# Patient Record
Sex: FEMALE | Race: BLACK OR AFRICAN AMERICAN | NOT HISPANIC OR LATINO | Employment: STUDENT | URBAN - METROPOLITAN AREA
[De-identification: names, ages, dates, MRNs, and addresses within clinical notes are randomized per-mention and may not be internally consistent; named-entity substitution may affect disease eponyms.]

---
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
## 2025-01-02 ENCOUNTER — HOSPITAL ENCOUNTER (EMERGENCY)
Facility: HOSPITAL | Age: 20
Discharge: HOME | End: 2025-01-02
Attending: EMERGENCY MEDICINE
Payer: COMMERCIAL

## 2025-01-02 ENCOUNTER — NURSE TRIAGE (OUTPATIENT)
Dept: ADMINISTRATIVE | Facility: CLINIC | Age: 20
End: 2025-01-02
Payer: COMMERCIAL

## 2025-01-02 VITALS
HEART RATE: 86 BPM | SYSTOLIC BLOOD PRESSURE: 103 MMHG | WEIGHT: 189 LBS | BODY MASS INDEX: 34.78 KG/M2 | RESPIRATION RATE: 18 BRPM | OXYGEN SATURATION: 100 % | DIASTOLIC BLOOD PRESSURE: 57 MMHG | TEMPERATURE: 98 F | HEIGHT: 62 IN

## 2025-01-02 DIAGNOSIS — R10.9 ABDOMINAL PAIN AFFECTING PREGNANCY: Primary | ICD-10-CM

## 2025-01-02 DIAGNOSIS — O26.899 ABDOMINAL PAIN AFFECTING PREGNANCY: Primary | ICD-10-CM

## 2025-01-02 DIAGNOSIS — O21.0 HYPEREMESIS GRAVIDARUM: ICD-10-CM

## 2025-01-02 LAB
ALBUMIN SERPL BCP-MCNC: 3.9 G/DL (ref 3.5–5.2)
ALP SERPL-CCNC: 44 U/L (ref 40–150)
ALT SERPL W/O P-5'-P-CCNC: 17 U/L (ref 10–44)
ANION GAP SERPL CALC-SCNC: 12 MMOL/L (ref 8–16)
AST SERPL-CCNC: 16 U/L (ref 10–40)
BACTERIA #/AREA URNS HPF: ABNORMAL /HPF
BASOPHILS # BLD AUTO: 0.03 K/UL (ref 0–0.2)
BASOPHILS NFR BLD: 0.2 % (ref 0–1.9)
BILIRUB SERPL-MCNC: 0.4 MG/DL (ref 0.1–1)
BILIRUB UR QL STRIP: NEGATIVE
BUN SERPL-MCNC: 7 MG/DL (ref 6–20)
CALCIUM SERPL-MCNC: 9 MG/DL (ref 8.7–10.5)
CHLORIDE SERPL-SCNC: 105 MMOL/L (ref 95–110)
CLARITY UR: ABNORMAL
CO2 SERPL-SCNC: 18 MMOL/L (ref 23–29)
COLOR UR: YELLOW
CREAT SERPL-MCNC: 0.7 MG/DL (ref 0.5–1.4)
DIFFERENTIAL METHOD BLD: ABNORMAL
EOSINOPHIL # BLD AUTO: 0.1 K/UL (ref 0–0.5)
EOSINOPHIL NFR BLD: 0.6 % (ref 0–8)
ERYTHROCYTE [DISTWIDTH] IN BLOOD BY AUTOMATED COUNT: 13 % (ref 11.5–14.5)
EST. GFR  (NO RACE VARIABLE): >60 ML/MIN/1.73 M^2
GLUCOSE SERPL-MCNC: 78 MG/DL (ref 70–110)
GLUCOSE UR QL STRIP: NEGATIVE
HCG INTACT+B SERPL-ACNC: NORMAL MIU/ML
HCT VFR BLD AUTO: 36.4 % (ref 37–48.5)
HGB BLD-MCNC: 12 G/DL (ref 12–16)
HGB UR QL STRIP: NEGATIVE
HYALINE CASTS #/AREA URNS LPF: 0 /LPF
IMM GRANULOCYTES # BLD AUTO: 0.06 K/UL (ref 0–0.04)
IMM GRANULOCYTES NFR BLD AUTO: 0.5 % (ref 0–0.5)
KETONES UR QL STRIP: ABNORMAL
LEUKOCYTE ESTERASE UR QL STRIP: ABNORMAL
LIPASE SERPL-CCNC: 27 U/L (ref 4–60)
LYMPHOCYTES # BLD AUTO: 2.3 K/UL (ref 1–4.8)
LYMPHOCYTES NFR BLD: 18.9 % (ref 18–48)
MCH RBC QN AUTO: 29 PG (ref 27–31)
MCHC RBC AUTO-ENTMCNC: 33 G/DL (ref 32–36)
MCV RBC AUTO: 88 FL (ref 82–98)
MICROSCOPIC COMMENT: ABNORMAL
MONOCYTES # BLD AUTO: 0.9 K/UL (ref 0.3–1)
MONOCYTES NFR BLD: 7.3 % (ref 4–15)
NEUTROPHILS # BLD AUTO: 8.9 K/UL (ref 1.8–7.7)
NEUTROPHILS NFR BLD: 72.5 % (ref 38–73)
NITRITE UR QL STRIP: NEGATIVE
NRBC BLD-RTO: 0 /100 WBC
PH UR STRIP: 6 [PH] (ref 5–8)
PLATELET # BLD AUTO: 240 K/UL (ref 150–450)
PLATELET BLD QL SMEAR: ABNORMAL
PMV BLD AUTO: 9.7 FL (ref 9.2–12.9)
POTASSIUM SERPL-SCNC: 3.3 MMOL/L (ref 3.5–5.1)
PROT SERPL-MCNC: 7.2 G/DL (ref 6–8.4)
PROT UR QL STRIP: ABNORMAL
RBC # BLD AUTO: 4.14 M/UL (ref 4–5.4)
RBC #/AREA URNS HPF: 0 /HPF (ref 0–4)
SODIUM SERPL-SCNC: 135 MMOL/L (ref 136–145)
SP GR UR STRIP: >1.03 (ref 1–1.03)
SQUAMOUS #/AREA URNS HPF: 25 /HPF
UNIDENT CRYS URNS QL MICRO: ABNORMAL
URN SPEC COLLECT METH UR: ABNORMAL
UROBILINOGEN UR STRIP-ACNC: NEGATIVE EU/DL
WBC # BLD AUTO: 12.26 K/UL (ref 3.9–12.7)
WBC #/AREA URNS HPF: 7 /HPF (ref 0–5)

## 2025-01-02 PROCEDURE — 99285 EMERGENCY DEPT VISIT HI MDM: CPT | Mod: 25

## 2025-01-02 PROCEDURE — 84702 CHORIONIC GONADOTROPIN TEST: CPT | Performed by: REGISTERED NURSE

## 2025-01-02 PROCEDURE — 85025 COMPLETE CBC W/AUTO DIFF WBC: CPT | Performed by: REGISTERED NURSE

## 2025-01-02 PROCEDURE — 96361 HYDRATE IV INFUSION ADD-ON: CPT

## 2025-01-02 PROCEDURE — 96374 THER/PROPH/DIAG INJ IV PUSH: CPT

## 2025-01-02 PROCEDURE — 80053 COMPREHEN METABOLIC PANEL: CPT | Performed by: REGISTERED NURSE

## 2025-01-02 PROCEDURE — 63600175 PHARM REV CODE 636 W HCPCS: Performed by: REGISTERED NURSE

## 2025-01-02 PROCEDURE — 83690 ASSAY OF LIPASE: CPT | Performed by: REGISTERED NURSE

## 2025-01-02 PROCEDURE — 96375 TX/PRO/DX INJ NEW DRUG ADDON: CPT

## 2025-01-02 PROCEDURE — 81000 URINALYSIS NONAUTO W/SCOPE: CPT | Performed by: REGISTERED NURSE

## 2025-01-02 PROCEDURE — 25000003 PHARM REV CODE 250: Performed by: REGISTERED NURSE

## 2025-01-02 RX ORDER — DIPHENHYDRAMINE HYDROCHLORIDE 50 MG/ML
12.5 INJECTION INTRAMUSCULAR; INTRAVENOUS
Status: COMPLETED | OUTPATIENT
Start: 2025-01-02 | End: 2025-01-02

## 2025-01-02 RX ORDER — METOCLOPRAMIDE HYDROCHLORIDE 5 MG/ML
10 INJECTION INTRAMUSCULAR; INTRAVENOUS
Status: COMPLETED | OUTPATIENT
Start: 2025-01-02 | End: 2025-01-02

## 2025-01-02 RX ADMIN — METOCLOPRAMIDE 10 MG: 5 INJECTION, SOLUTION INTRAMUSCULAR; INTRAVENOUS at 02:01

## 2025-01-02 RX ADMIN — DIPHENHYDRAMINE HYDROCHLORIDE 12.5 MG: 50 INJECTION, SOLUTION INTRAMUSCULAR; INTRAVENOUS at 02:01

## 2025-01-02 RX ADMIN — SODIUM CHLORIDE 1000 ML: 9 INJECTION, SOLUTION INTRAVENOUS at 02:01

## 2025-01-02 NOTE — Clinical Note
"Sandy Chapmananoop Aguilar was seen and treated in our emergency department on 1/2/2025.  She may return to work on 01/03/2025.       If you have any questions or concerns, please don't hesitate to call.       RN    "

## 2025-01-02 NOTE — Clinical Note
Chau Emanuel accompanied their spouse to the emergency department on 1/2/2025. They may return to work on 01/03/2025.      If you have any questions or concerns, please don't hesitate to call.       ADOLPH

## 2025-01-02 NOTE — TELEPHONE ENCOUNTER
Pt calling with c/o abdominal pain and vomiting. She said that the pain started after vomiting and it lower left side and she is 9 weeks preg. Pt triaged and care advice to go to the ED. Pt told to call back if any assistance once home or worsening.               Reason for Disposition   MODERATE-SEVERE abdominal pain    Additional Information   Negative: Passed out (e.g., fainted, lost consciousness, blacked out and was not responding)   Negative: Shock suspected (e.g., cold/pale/clammy skin, too weak to stand, low BP, rapid pulse)   Negative: Difficult to awaken or acting confused (e.g., disoriented, slurred speech)   Negative: Sounds like a life-threatening emergency to the triager    Protocols used: Pregnancy - Abdominal Pain Less Than 20 Weeks EGA-A-OH

## 2025-01-02 NOTE — Clinical Note
"Sandy Chapmananoop Aguilar was seen and treated in our emergency department on 1/2/2025.  She may return to school on 01/03/2025.      If you have any questions or concerns, please don't hesitate to call.       RN"

## 2025-01-02 NOTE — ED PROVIDER NOTES
Encounter Date: 2025       History     Chief Complaint   Patient presents with    Abdominal Pain     Pt reports LLQ sharp abdominal pain with nausea and vomiting starting today. Pt reports  9 weeks pregnant. -diarrhea     19-year-old female presents emergency department with complaints of abdominal pain.  Patient states pain began today.  History of hyperemesis.  Patient reports vomiting intermittently since she found out she was pregnant.  She denies any diarrhea, denies any fever, chills, chest pain, shortness of breath or any other symptoms.    The history is provided by the patient.     Review of patient's allergies indicates:  No Known Allergies  History reviewed. No pertinent past medical history.  Past Surgical History:   Procedure Laterality Date    CHONDROPLASTY OF KNEE Right 2022    Procedure: CHONDROPLASTY, KNEE;  Surgeon: Eleazar Del Rosario MD;  Location: [facility];  Service: Orthopedics;  Laterality: Right;    EXCISION OF MEDIAL MENISCUS OF KNEE Right 2022    Procedure: MENISCECTOMY, KNEE, MEDIAL;  Surgeon: Eleazar Del Rosario MD;  Location: [facility];  Service: Orthopedics;  Laterality: Right;    KNEE ARTHROSCOPY W/ MENISCECTOMY Right 2022    Procedure: ARTHROSCOPY, KNEE, WITH MENISCECTOMY;  Surgeon: Eleazar Del Rosario MD;  Location: [facility] OR;  Service: Orthopedics;  Laterality: Right;  medial meniscectomy    RECONSTRUCTION OF ANTERIOR CRUCIATE LIGAMENT USING GRAFT Right 2022    Procedure: RECONSTRUCTION, KNEE, ACL, USING GRAFT;  Surgeon: Eleazar Del Rosario MD;  Location: [facility];  Service: Orthopedics;  Laterality: Right;  cartilage harvest     Family History   Problem Relation Name Age of Onset    No Known Problems Mother      No Known Problems Father      Breast cancer Neg Hx      Ovarian cancer Neg Hx      Colon cancer Neg Hx       Social History     Tobacco Use    Smoking status: Never     Passive exposure: Yes    Smokeless tobacco: Never   Substance Use Topics    Alcohol  use: No    Drug use: Yes     Types: Marijuana     Review of Systems   Constitutional:  Negative for fever.   HENT:  Negative for sore throat.    Respiratory:  Negative for shortness of breath.    Cardiovascular:  Negative for chest pain.   Gastrointestinal:  Positive for abdominal pain, nausea and vomiting.   Genitourinary:  Negative for dysuria.   Musculoskeletal:  Negative for back pain.   Skin:  Negative for rash.   Neurological:  Negative for weakness.   Hematological:  Does not bruise/bleed easily.   All other systems reviewed and are negative.      Physical Exam     Initial Vitals [01/02/25 1306]   BP Pulse Resp Temp SpO2   115/63 82 16 97.5 °F (36.4 °C) 98 %      MAP       --         Physical Exam    Constitutional: She appears well-developed and well-nourished. She is not diaphoretic. No distress.   HENT:   Head: Normocephalic and atraumatic.   Eyes: Conjunctivae and EOM are normal. Pupils are equal, round, and reactive to light.   Neck: Neck supple.   Normal range of motion.  Cardiovascular:  Normal rate, regular rhythm and normal heart sounds.           No murmur heard.  Pulmonary/Chest: Breath sounds normal. No respiratory distress. She has no wheezes. She has no rales.   Abdominal: Abdomen is soft. Bowel sounds are normal. There is abdominal tenderness in the left lower quadrant. There is no rebound and no guarding.   Musculoskeletal:         General: No tenderness or edema. Normal range of motion.      Cervical back: Normal range of motion and neck supple.     Neurological: She is alert and oriented to person, place, and time. No cranial nerve deficit. GCS score is 15. GCS eye subscore is 4. GCS verbal subscore is 5. GCS motor subscore is 6.   Skin: Skin is warm and dry. Capillary refill takes less than 2 seconds.   Psychiatric: She has a normal mood and affect. Thought content normal.         ED Course   Procedures  Labs Reviewed   CBC W/ AUTO DIFFERENTIAL - Abnormal       Result Value    WBC 12.26       RBC 4.14      Hemoglobin 12.0      Hematocrit 36.4 (*)     MCV 88      MCH 29.0      MCHC 33.0      RDW 13.0      Platelets 240      MPV 9.7      Immature Granulocytes 0.5      Gran # (ANC) 8.9 (*)     Immature Grans (Abs) 0.06 (*)     Lymph # 2.3      Mono # 0.9      Eos # 0.1      Baso # 0.03      nRBC 0      Gran % 72.5      Lymph % 18.9      Mono % 7.3      Eosinophil % 0.6      Basophil % 0.2      Platelet Estimate Clumped (*)     Differential Method Automated      Narrative:     Release to patient->Immediate   COMPREHENSIVE METABOLIC PANEL - Abnormal    Sodium 135 (*)     Potassium 3.3 (*)     Chloride 105      CO2 18 (*)     Glucose 78      BUN 7      Creatinine 0.7      Calcium 9.0      Total Protein 7.2      Albumin 3.9      Total Bilirubin 0.4      Alkaline Phosphatase 44      AST 16      ALT 17      eGFR >60      Anion Gap 12      Narrative:     Release to patient->Immediate   URINALYSIS, REFLEX TO URINE CULTURE - Abnormal    Specimen UA Urine, Clean Catch      Color, UA Yellow      Appearance, UA Hazy (*)     pH, UA 6.0      Specific Gravity, UA >1.030 (*)     Protein, UA 1+ (*)     Glucose, UA Negative      Ketones, UA 2+ (*)     Bilirubin (UA) Negative      Occult Blood UA Negative      Nitrite, UA Negative      Urobilinogen, UA Negative      Leukocytes, UA 2+ (*)     Narrative:     Specimen Source->Urine   URINALYSIS MICROSCOPIC - Abnormal    RBC, UA 0      WBC, UA 7 (*)     Bacteria Occasional      Squam Epithel, UA 25      Hyaline Casts, UA 0      Unclass Kaye UA Occasional      Microscopic Comment SEE COMMENT      Narrative:     Specimen Source->Urine   LIPASE    Lipase 27      Narrative:     Release to patient->Immediate   HCG, QUANTITATIVE    HCG Quant [id number]      Narrative:     Release to patient->Immediate          Imaging Results              US OB <14 Wks, TransAbd, Single Gestation (Final result)  Result time 01/02/25 14:13:51      Final result by Sukhwinder Deleon MD (Timothy) (01/02/25  14:13:51)                   Impression:      1.  Viable intrauterine pregnancy.  Estimated sonographic age of 9 weeks and 4 days with ANÍBAL of 08/03/2025.      Electronically signed by: Sukhwinder Deleon MD  Date:    01/02/2025  Time:    14:13               Narrative:    EXAMINATION:  US OB <14 WEEKS TRANSABDOM, SINGLE GESTATION    CLINICAL HISTORY:  Other specified pregnancy related conditions, unspecified trimester    COMPARISON:  No comparison studies are available.    FINDINGS:  Viable intrauterine pregnancy.  Crown-rump length of 27 mm.  Fetal heart rate 171 beats per minute.  No evidence of subchorionic hemorrhage.  Uterus measures 9.1 cm.    Right ovary measures 2.7 x 1.9 x 2.1 cm.  Left ovary measures 3.0 x 1.8 x 1.5 cm.  Vascularity is present bilaterally.    No significant free fluid.                                       Medications   sodium chloride 0.9% bolus 1,000 mL 1,000 mL (0 mLs Intravenous Stopped 1/2/25 1607)   metoclopramide injection 10 mg (10 mg Intravenous Given 1/2/25 1421)   diphenhydrAMINE injection 12.5 mg (12.5 mg Intravenous Given 1/2/25 1422)     Medical Decision Making  Amount and/or Complexity of Data Reviewed  Labs: ordered.     Details: Unremarkable  Radiology: ordered.     Details: Viable IUP  Discussion of management or test interpretation with external provider(s): Patient had marked improvement after fluids and medications.  Patient has a complaints at this time.  She denies any abdominal pain at this time.  Patient has not vomited while in the emergency department.    Risk  Prescription drug management.  Risk Details: I discussed with patient and/or family/caretaker that evaluation in the ED does not suggest any emergent or life threatening medical conditions requiring immediate intervention beyond what was provided in the ED, and I believe patient is safe for discharge.  Regardless, an unremarkable evaluation in the ED does not preclude the development or presence of a serious of  life threatening condition. As such, patient was instructed to return immediately for any worsening or change in current symptoms.                                        Clinical Impression:  Final diagnoses:  [O26.899, R10.9] Abdominal pain affecting pregnancy (Primary)  [O21.0] Hyperemesis gravidarum          ED Disposition Condition    Discharge Stable          ED Prescriptions    None       Follow-up Information       Follow up With Specialties Details Why Contact [facility]    Clinic, [facility]  In 1 week  [address] [facility] [address] [place] [address]  [phone]      [facility] - Emergency Dept. Emergency Medicine  If symptoms worsen [address] [facility] [address] [phone]             Jones Banks Jr., P  01/02/25 [id number]

## 2025-01-02 NOTE — TELEPHONE ENCOUNTER
Pt is currently 9w4d pregnant.  C/O severe abdominal pain.  Line disconnected prior to transfer to NOC.  NOC made an attempt to contact pt without success; LVM on unidentified VM; # verified.  Pt called back and spoke with another NOC.  Please see other NOC note for symptom triage.  Encounter closed as duplicate encounter.    Reason for Disposition   Caller has already spoken with another triager and has no further questions    Additional Information   Negative: Caller has already spoken with the PCP (or office), and has no further questions    Protocols used: No Contact or Duplicate Contact Call-A-OH

## 2025-01-10 ENCOUNTER — HOSPITAL ENCOUNTER (EMERGENCY)
Facility: HOSPITAL | Age: 20
Discharge: LEFT BEFORE DISCHARGE | End: 2025-01-10
Attending: EMERGENCY MEDICINE
Payer: COMMERCIAL

## 2025-01-10 ENCOUNTER — TELEPHONE (OUTPATIENT)
Dept: OBSTETRICS AND GYNECOLOGY | Facility: CLINIC | Age: 20
End: 2025-01-10
Payer: COMMERCIAL

## 2025-01-10 VITALS
OXYGEN SATURATION: 100 % | HEIGHT: 62 IN | WEIGHT: 186 LBS | SYSTOLIC BLOOD PRESSURE: 119 MMHG | HEART RATE: 67 BPM | TEMPERATURE: 98 F | BODY MASS INDEX: 34.23 KG/M2 | RESPIRATION RATE: 16 BRPM | DIASTOLIC BLOOD PRESSURE: 64 MMHG

## 2025-01-10 DIAGNOSIS — N93.9 VAGINAL BLEEDING: ICD-10-CM

## 2025-01-10 DIAGNOSIS — O20.8 SUBCHORIONIC HEMORRHAGE IN FIRST TRIMESTER: Primary | ICD-10-CM

## 2025-01-10 LAB
AMORPH CRY URNS QL MICRO: NORMAL
BACTERIA #/AREA URNS HPF: NORMAL /HPF
BASOPHILS # BLD AUTO: 0.02 K/UL (ref 0–0.2)
BASOPHILS NFR BLD: 0.2 % (ref 0–1.9)
BILIRUB UR QL STRIP: NEGATIVE
CLARITY UR: CLEAR
COLOR UR: YELLOW
DIFFERENTIAL METHOD BLD: ABNORMAL
EOSINOPHIL # BLD AUTO: 0.1 K/UL (ref 0–0.5)
EOSINOPHIL NFR BLD: 1.2 % (ref 0–8)
ERYTHROCYTE [DISTWIDTH] IN BLOOD BY AUTOMATED COUNT: 12.8 % (ref 11.5–14.5)
GLUCOSE UR QL STRIP: NEGATIVE
HCG INTACT+B SERPL-ACNC: NORMAL MIU/ML
HCT VFR BLD AUTO: 36.4 % (ref 37–48.5)
HGB BLD-MCNC: 12.2 G/DL (ref 12–16)
HGB UR QL STRIP: NEGATIVE
HYALINE CASTS #/AREA URNS LPF: 0 /LPF
IMM GRANULOCYTES # BLD AUTO: 0.02 K/UL (ref 0–0.04)
IMM GRANULOCYTES NFR BLD AUTO: 0.2 % (ref 0–0.5)
KETONES UR QL STRIP: ABNORMAL
LEUKOCYTE ESTERASE UR QL STRIP: NEGATIVE
LYMPHOCYTES # BLD AUTO: 2 K/UL (ref 1–4.8)
LYMPHOCYTES NFR BLD: 22.9 % (ref 18–48)
MCH RBC QN AUTO: 29 PG (ref 27–31)
MCHC RBC AUTO-ENTMCNC: 33.5 G/DL (ref 32–36)
MCV RBC AUTO: 87 FL (ref 82–98)
MICROSCOPIC COMMENT: NORMAL
MONOCYTES # BLD AUTO: 0.8 K/UL (ref 0.3–1)
MONOCYTES NFR BLD: 9 % (ref 4–15)
NEUTROPHILS # BLD AUTO: 5.8 K/UL (ref 1.8–7.7)
NEUTROPHILS NFR BLD: 66.5 % (ref 38–73)
NITRITE UR QL STRIP: NEGATIVE
NRBC BLD-RTO: 0 /100 WBC
PH UR STRIP: 8 [PH] (ref 5–8)
PLATELET # BLD AUTO: 288 K/UL (ref 150–450)
PMV BLD AUTO: 9 FL (ref 9.2–12.9)
PROT UR QL STRIP: ABNORMAL
RBC # BLD AUTO: 4.2 M/UL (ref 4–5.4)
RBC #/AREA URNS HPF: 0 /HPF (ref 0–4)
SP GR UR STRIP: 1.03 (ref 1–1.03)
SQUAMOUS #/AREA URNS HPF: 2 /HPF
URN SPEC COLLECT METH UR: ABNORMAL
UROBILINOGEN UR STRIP-ACNC: ABNORMAL EU/DL
WBC # BLD AUTO: 8.69 K/UL (ref 3.9–12.7)
WBC #/AREA URNS HPF: 0 /HPF (ref 0–5)

## 2025-01-10 PROCEDURE — 81000 URINALYSIS NONAUTO W/SCOPE: CPT | Performed by: NURSE PRACTITIONER

## 2025-01-10 PROCEDURE — 99900041 HC LEFT WITHOUT BEING SEEN- EMERGENCY

## 2025-01-10 PROCEDURE — 85025 COMPLETE CBC W/AUTO DIFF WBC: CPT | Performed by: NURSE PRACTITIONER

## 2025-01-10 PROCEDURE — 84702 CHORIONIC GONADOTROPIN TEST: CPT | Performed by: NURSE PRACTITIONER

## 2025-01-10 NOTE — TELEPHONE ENCOUNTER
----- Message from Simran sent at 1/10/2025 12:19 PM CST -----  Contact: Arrion  Type:  Needs Medical Advice    Who Called: Arrion  Symptoms (please be specific): Bleeding, light cramps  How long has patient had these symptoms:  Yesterday 1\9\25 and this morning  Pharmacy name and phone #:    [id number] - [place] - [address] AT [facility] [place] [address]  Phone: [phone] Fax: [phone]  Would the patient rather a call back or a response via [facility]? Call back  Best Call Back Number:  Please call her at   Additional Information: She stated the bleeding has stop and when she notice it at work yesterday she did not go to ER

## 2025-01-10 NOTE — FIRST PROVIDER EVALUATION
Medical screening examination initiated.  I have conducted a focused provider triage encounter, findings are as follows:    Brief history of present illness:   patient complains of vaginal bleeding sent by [facility]     Patient left from the waiting room after ultrasound before physical exam    Brief workup plan:   lab work ultrasound    Preliminary workup initiated; this workup will be continued and followed by the physician or advanced practice provider that is assigned to the patient when roomed.

## 2025-01-10 NOTE — TELEPHONE ENCOUNTER
Pt called in regards to bleeding/ spotting.  Pt states she was at work yesterday when she noticed blood. Pt states she did not have any bleeding today, but is very concerned advised pt to report to ED to be assessed. Pt KENNETH BERRIOS LPN  OB/GYN

## 2025-01-14 ENCOUNTER — ROUTINE PRENATAL (OUTPATIENT)
Dept: OBSTETRICS AND GYNECOLOGY | Facility: CLINIC | Age: 20
End: 2025-01-14
Payer: COMMERCIAL

## 2025-01-14 VITALS
SYSTOLIC BLOOD PRESSURE: 105 MMHG | DIASTOLIC BLOOD PRESSURE: 60 MMHG | WEIGHT: 189.63 LBS | BODY MASS INDEX: 34.68 KG/M2

## 2025-01-14 DIAGNOSIS — O21.9 NAUSEA/VOMITING IN PREGNANCY: ICD-10-CM

## 2025-01-14 DIAGNOSIS — Z34.01 PRIMIGRAVIDA IN FIRST TRIMESTER: ICD-10-CM

## 2025-01-14 DIAGNOSIS — Z3A.11 11 WEEKS GESTATION OF PREGNANCY: Primary | ICD-10-CM

## 2025-01-14 PROBLEM — Z30.011 OCP (ORAL CONTRACEPTIVE PILLS) INITIATION: Status: RESOLVED | Noted: 2024-08-19 | Resolved: 2025-01-14

## 2025-01-14 PROCEDURE — 0502F SUBSEQUENT PRENATAL CARE: CPT | Mod: CPTII,S$GLB,, | Performed by: MIDWIFE

## 2025-01-14 PROCEDURE — 99999 PR PBB SHADOW E&M-EST. PATIENT-LVL II: CPT | Mod: PBBFAC,,, | Performed by: MIDWIFE

## 2025-01-14 RX ORDER — PROMETHAZINE HYDROCHLORIDE 25 MG/1
25 TABLET ORAL NIGHTLY PRN
Qty: 30 TABLET | Refills: 1 | Status: SHIPPED | OUTPATIENT
Start: 2025-01-14

## 2025-01-14 RX ORDER — FOLIC ACID 1 MG/1
1 TABLET ORAL DAILY
Qty: 30 TABLET | Refills: 11 | Status: SHIPPED | OUTPATIENT
Start: 2025-01-14 | End: 2026-01-14

## 2025-01-14 NOTE — LETTER
January 14, 2025      [facility] 2nd Fl  [address] [facility]  [place] [address]  Phone: [phone]  Fax: [phone]       Patient: Sandy Aguilar   YOB: 2005  Date of Visit: 01/14/2025    To Whom It May Concern:    Chau Emanuel accompanied Sandy Aguilar at [facility] on 01/14/2025. The patient may return to work/school on 1/15/2025 with no restrictions. If you have any questions or concerns, or if I can be of further assistance, please do not hesitate to contact me.    Sincerely,    Comfort Davenport CNM

## 2025-01-14 NOTE — LETTER
January 14, 2025      [facility] 2nd [facility]  [address] [facility]  [place] [address]  Phone: [phone]  Fax: [phone]       Patient: Sandy Aguilar   YOB: 2005  Date of Visit: 01/14/2025    To Whom It May Concern:    Sandy Aguilar  was at [facility] on 01/14/2025. The patient may return to work/school on 1/16/2025 with restrictions. If you have any questions or concerns, or if I can be of further assistance, please do not hesitate to contact me.    Sincerely,    Comfort Davenport CNM

## 2025-01-14 NOTE — LETTER
January 14, 2025    Sandy Aguilar  [address]  [facility] [address]              [facility]  [address]  [place] [address]  Phone: [phone]  Fax: [phone]    To Whom It May Concern:    Ms. Aguilar is currently under our care for pregnancy.  Estimated Date of Delivery: ***        Sincerely,    Aaliyah White MA

## 2025-01-14 NOTE — LETTER
January 14, 2025    Sandy Aguilar  [address]  [facility] [address]         [facility] 2nd Fl  [address]  [place] [address]  Phone: [phone]  Fax: [phone] January 14, 2025     Patient: Sandy Aguilar   YOB: 2005   Date of Visit: 1/14/2025       To Whom It May Concern:    It is my medical opinion that Sandy Aguilar may return to light duty immediately with the following restrictions: no heavy lifting of more than 15lbs .    If you have any questions or concerns, please don't hesitate to call.    Sincerely,        Comfort Davenport CNM

## 2025-01-23 NOTE — PROGRESS NOTES
11 weeks gestation of pregnancy  -     Connected MOM Enrollment  -     Assign Connected MOM Program Consent Questionnaire    Overall doing OK, went to the ER 1/10 for spotting & normal U/S. No VB at present.  Declines genetic screening  Connected MOM order placed and instructions shila  TWG: -7.1oz  Bleeding and pain precautions reviewed  RTC in 4 weeks, sooner if needed     Primigravida in first trimester    Nausea/vomiting in pregnancy  -     promethazine (PHENERGAN) 25 MG tablet; Take 1 tablet (25 mg total) by mouth nightly as needed for Nausea.  Dispense: 30 tablet; Refill: 1  -     folic acid (FOLVITE) 1 MG tablet; Take 1 tablet (1 mg total) by mouth once daily.  Dispense: 30 tablet; Refill: 11    Vomiting 1X/day with persistent nausea. Rx Phenergan, discussed may cause drowsiness. Unable to tolerate PNV --Rx folic acid.

## 2025-02-07 ENCOUNTER — ROUTINE PRENATAL (OUTPATIENT)
Dept: OBSTETRICS AND GYNECOLOGY | Facility: CLINIC | Age: 20
End: 2025-02-07
Payer: COMMERCIAL

## 2025-02-07 VITALS
BODY MASS INDEX: 33.67 KG/M2 | SYSTOLIC BLOOD PRESSURE: 118 MMHG | DIASTOLIC BLOOD PRESSURE: 80 MMHG | WEIGHT: 184.06 LBS

## 2025-02-07 DIAGNOSIS — Z3A.14 14 WEEKS GESTATION OF PREGNANCY: Primary | ICD-10-CM

## 2025-02-07 DIAGNOSIS — Z36.89 ENCOUNTER FOR FETAL ANATOMIC SURVEY: ICD-10-CM

## 2025-02-07 PROCEDURE — 0502F SUBSEQUENT PRENATAL CARE: CPT | Mod: CPTII,S$GLB,,

## 2025-02-07 PROCEDURE — 99999 PR PBB SHADOW E&M-EST. PATIENT-LVL III: CPT | Mod: PBBFAC,,,

## 2025-02-07 NOTE — PROGRESS NOTES
19 y.o. female  at 14w5d   is not feeling flutters yet/FM, denies VB, LOF or cramping  Doing well. Patient does have a cold. Denies fever/chills/body aches. Reviewed pregnancy safe medications for cold and congestion. Patient verbalized understanding and will try these.    TWG: -5 lbs   Anatomy US NV  Genetic testing declined  Connected MoMs    1. 14 weeks gestation of pregnancy  - routine PNC   2. Encounter for fetal anatomic survey  -     US OB/GYN Procedure ([facility])-Future; Future         Reviewed warning signs, normal FM,  labor precautions and how/when to call. Patient states understanding.  RTC x 5 wks, call or present sooner prn.        Patent

## 2025-02-16 ENCOUNTER — PATIENT MESSAGE (OUTPATIENT)
Dept: OTHER | Facility: OTHER | Age: 20
End: 2025-02-16
Payer: COMMERCIAL

## 2025-02-23 ENCOUNTER — PATIENT MESSAGE (OUTPATIENT)
Dept: OTHER | Facility: OTHER | Age: 20
End: 2025-02-23
Payer: COMMERCIAL

## 2025-03-07 ENCOUNTER — PROCEDURE VISIT (OUTPATIENT)
Dept: OBSTETRICS AND GYNECOLOGY | Facility: CLINIC | Age: 20
End: 2025-03-07
Payer: COMMERCIAL

## 2025-03-07 ENCOUNTER — ROUTINE PRENATAL (OUTPATIENT)
Dept: OBSTETRICS AND GYNECOLOGY | Facility: CLINIC | Age: 20
End: 2025-03-07
Payer: COMMERCIAL

## 2025-03-07 ENCOUNTER — PATIENT MESSAGE (OUTPATIENT)
Dept: OBSTETRICS AND GYNECOLOGY | Facility: CLINIC | Age: 20
End: 2025-03-07

## 2025-03-07 VITALS
BODY MASS INDEX: 34.88 KG/M2 | HEART RATE: 96 BPM | SYSTOLIC BLOOD PRESSURE: 117 MMHG | DIASTOLIC BLOOD PRESSURE: 68 MMHG | WEIGHT: 190.69 LBS

## 2025-03-07 DIAGNOSIS — Z36.89 ENCOUNTER FOR FETAL ANATOMIC SURVEY: ICD-10-CM

## 2025-03-07 DIAGNOSIS — Z3A.18 18 WEEKS GESTATION OF PREGNANCY: Primary | ICD-10-CM

## 2025-03-07 PROCEDURE — 76811 OB US DETAILED SNGL FETUS: CPT | Mod: S$GLB,,, | Performed by: OBSTETRICS & GYNECOLOGY

## 2025-03-07 PROCEDURE — 99999 PR PBB SHADOW E&M-EST. PATIENT-LVL III: CPT | Mod: PBBFAC,,,

## 2025-03-07 NOTE — PROGRESS NOTES
19 y.o. female  at 18w5d   is feeling flutters /FM, denies VB, LOF or cramping  Doing well. Still has cough and congestion since last visit.  Discussed mucinex, hydration, and allergy medications during pregnancy. Patient verbalized understanding   TW lbs   Reviewed anatomy US: , vertex, posterior placenta, 3VC, JAMILAH wNL, CL 35.3mm, anatomy appears to be complete, will await MFM review  Genetic testing declined     1. 18 weeks gestation of pregnancy  - routine PNC        Reviewed warning signs, normal FM,  labor precautions and how/when to call. Patient states understanding.  RTC x 4 wks, call or present sooner prn.

## 2025-03-16 ENCOUNTER — PATIENT MESSAGE (OUTPATIENT)
Dept: OTHER | Facility: OTHER | Age: 20
End: 2025-03-16
Payer: COMMERCIAL

## 2025-04-04 ENCOUNTER — ROUTINE PRENATAL (OUTPATIENT)
Dept: OBSTETRICS AND GYNECOLOGY | Facility: CLINIC | Age: 20
End: 2025-04-04
Payer: COMMERCIAL

## 2025-04-04 VITALS — DIASTOLIC BLOOD PRESSURE: 72 MMHG | BODY MASS INDEX: 34.84 KG/M2 | WEIGHT: 190.5 LBS | SYSTOLIC BLOOD PRESSURE: 124 MMHG

## 2025-04-04 DIAGNOSIS — O21.9 NAUSEA AND VOMITING DURING PREGNANCY: ICD-10-CM

## 2025-04-04 DIAGNOSIS — Z34.02 ENCOUNTER FOR SUPERVISION OF NORMAL FIRST PREGNANCY IN SECOND TRIMESTER: Primary | ICD-10-CM

## 2025-04-04 PROCEDURE — 99999 PR PBB SHADOW E&M-EST. PATIENT-LVL III: CPT | Mod: PBBFAC,,, | Performed by: STUDENT IN AN ORGANIZED HEALTH CARE EDUCATION/TRAINING PROGRAM

## 2025-04-04 RX ORDER — ONDANSETRON 4 MG/1
4 TABLET, ORALLY DISINTEGRATING ORAL EVERY 8 HOURS PRN
Qty: 30 TABLET | Refills: 2 | Status: SHIPPED | OUTPATIENT
Start: 2025-04-04

## 2025-04-04 RX ORDER — ONDANSETRON 4 MG/1
4 TABLET, ORALLY DISINTEGRATING ORAL EVERY 8 HOURS PRN
COMMUNITY
End: 2025-04-04 | Stop reason: SDUPTHER

## 2025-04-04 NOTE — PROGRESS NOTES
RETURN OB VISIT     CC: Scheduled prenatal visit at 22w5d    SUBJECTIVE:  The patient denies contractions.  She denies vaginal bleeding, leakage of fluid. She reports good fetal movement.    She was worried because the baby was more    Still having vomiting everyday. Some times more than once in the day. Usually right in the morning, other than that she is feeling okay. She is out of her zofran.     OBJECTIVE:  Physical Exam:  BP: 124/72  Weight: 86.4 kg (190 lb 7.6 oz)  Cardiac: warm and well perfused  Pulm: breathing comfortably on RA   Abd: soft, gravid, non-tender  Ext: symmetric; no significant bilateral lower extremity edema or bruising, moves all 4 extremities equally  Fetal Wellbeing: Fundal Height (cm): 22 cm, Fetal Heart Rate: 161, See Prenatal Flow Sheet     Assessment and Plan   Ms. Aguilar is a 19 y.o.,  22w5d who presents here for Routine Prenatal Visit   . She is overall doing well.    Encounter for supervision of normal first pregnancy in second trimester    Nausea and vomiting during pregnancy  -     ondansetron (ZOFRAN-ODT) 4 MG TbDL; Take 1 tablet (4 mg total) by mouth every 8 (eight) hours as needed (nausea).  Dispense: 30 tablet; Refill: 2       Lorin nKott MD  Obstetrics and Gynecology  [facility] [place], [place]

## 2025-04-13 ENCOUNTER — PATIENT MESSAGE (OUTPATIENT)
Dept: OTHER | Facility: OTHER | Age: 20
End: 2025-04-13
Payer: COMMERCIAL

## 2025-04-27 ENCOUNTER — PATIENT MESSAGE (OUTPATIENT)
Dept: OTHER | Facility: OTHER | Age: 20
End: 2025-04-27
Payer: COMMERCIAL

## 2025-05-02 ENCOUNTER — ROUTINE PRENATAL (OUTPATIENT)
Dept: OBSTETRICS AND GYNECOLOGY | Facility: CLINIC | Age: 20
End: 2025-05-02
Payer: COMMERCIAL

## 2025-05-02 VITALS — DIASTOLIC BLOOD PRESSURE: 62 MMHG | WEIGHT: 190.25 LBS | BODY MASS INDEX: 34.8 KG/M2 | SYSTOLIC BLOOD PRESSURE: 102 MMHG

## 2025-05-02 DIAGNOSIS — Z34.02 ENCOUNTER FOR SUPERVISION OF NORMAL FIRST PREGNANCY IN SECOND TRIMESTER: ICD-10-CM

## 2025-05-02 DIAGNOSIS — Z3A.26 26 WEEKS GESTATION OF PREGNANCY: Primary | ICD-10-CM

## 2025-05-02 PROBLEM — Z34.01 PRIMIGRAVIDA IN FIRST TRIMESTER: Status: RESOLVED | Noted: 2025-01-14 | Resolved: 2025-05-02

## 2025-05-02 PROCEDURE — 99999 PR PBB SHADOW E&M-EST. PATIENT-LVL II: CPT | Mod: PBBFAC,,,

## 2025-05-11 ENCOUNTER — PATIENT MESSAGE (OUTPATIENT)
Dept: OTHER | Facility: OTHER | Age: 20
End: 2025-05-11
Payer: MEDICAID

## 2025-05-16 ENCOUNTER — ROUTINE PRENATAL (OUTPATIENT)
Dept: OBSTETRICS AND GYNECOLOGY | Facility: CLINIC | Age: 20
End: 2025-05-16
Payer: MEDICAID

## 2025-05-16 ENCOUNTER — LAB VISIT (OUTPATIENT)
Dept: LAB | Facility: HOSPITAL | Age: 20
End: 2025-05-16
Payer: COMMERCIAL

## 2025-05-16 VITALS — SYSTOLIC BLOOD PRESSURE: 108 MMHG | WEIGHT: 199.5 LBS | DIASTOLIC BLOOD PRESSURE: 65 MMHG | BODY MASS INDEX: 36.49 KG/M2

## 2025-05-16 DIAGNOSIS — Z23 ENCOUNTER FOR VACCINATION: ICD-10-CM

## 2025-05-16 DIAGNOSIS — Z3A.26 26 WEEKS GESTATION OF PREGNANCY: ICD-10-CM

## 2025-05-16 DIAGNOSIS — Z3A.28 28 WEEKS GESTATION OF PREGNANCY: Primary | ICD-10-CM

## 2025-05-16 LAB
ABSOLUTE EOSINOPHIL (OHS): 0.07 K/UL
ABSOLUTE MONOCYTE (OHS): 0.87 K/UL (ref 0.3–1)
ABSOLUTE NEUTROPHIL COUNT (OHS): 11.21 K/UL (ref 1.8–7.7)
BASOPHILS # BLD AUTO: 0.02 K/UL
BASOPHILS NFR BLD AUTO: 0.1 %
ERYTHROCYTE [DISTWIDTH] IN BLOOD BY AUTOMATED COUNT: 13.5 % (ref 11.5–14.5)
GLUCOSE SERPL-MCNC: 161 MG/DL (ref 70–140)
HCT VFR BLD AUTO: 35.7 % (ref 37–48.5)
HGB BLD-MCNC: 11.5 GM/DL (ref 12–16)
HIV 1+2 AB+HIV1 P24 AG SERPL QL IA: NORMAL
IMM GRANULOCYTES # BLD AUTO: 0.07 K/UL (ref 0–0.04)
IMM GRANULOCYTES NFR BLD AUTO: 0.5 % (ref 0–0.5)
LYMPHOCYTES # BLD AUTO: 1.69 K/UL (ref 1–4.8)
MCH RBC QN AUTO: 29.5 PG (ref 27–31)
MCHC RBC AUTO-ENTMCNC: 32.2 G/DL (ref 32–36)
MCV RBC AUTO: 92 FL (ref 82–98)
NUCLEATED RBC (/100WBC) (OHS): 0 /100 WBC
PLATELET # BLD AUTO: 265 K/UL (ref 150–450)
PMV BLD AUTO: 11.1 FL (ref 9.2–12.9)
RBC # BLD AUTO: 3.9 M/UL (ref 4–5.4)
RELATIVE EOSINOPHIL (OHS): 0.5 %
RELATIVE LYMPHOCYTE (OHS): 12.1 % (ref 18–48)
RELATIVE MONOCYTE (OHS): 6.2 % (ref 4–15)
RELATIVE NEUTROPHIL (OHS): 80.6 % (ref 38–73)
T PALLIDUM IGG+IGM SER QL: NORMAL
WBC # BLD AUTO: 13.93 K/UL (ref 3.9–12.7)

## 2025-05-16 PROCEDURE — 36415 COLL VENOUS BLD VENIPUNCTURE: CPT

## 2025-05-16 PROCEDURE — 99213 OFFICE O/P EST LOW 20 MIN: CPT | Mod: PBBFAC

## 2025-05-16 PROCEDURE — 87389 HIV-1 AG W/HIV-1&-2 AB AG IA: CPT

## 2025-05-16 PROCEDURE — 85025 COMPLETE CBC W/AUTO DIFF WBC: CPT

## 2025-05-16 PROCEDURE — 82950 GLUCOSE TEST: CPT

## 2025-05-16 PROCEDURE — 86593 SYPHILIS TEST NON-TREP QUANT: CPT

## 2025-05-16 PROCEDURE — 99999 PR PBB SHADOW E&M-EST. PATIENT-LVL III: CPT | Mod: PBBFAC,,,

## 2025-05-16 NOTE — PROGRESS NOTES
19 y.o. female  at 28w5d   Reports + FM, denies VB, LOF or CTX  Doing well without concerns  TW lbs   28wk labs today (O POS)  Tdap today  LD card given and reviewed    1. 28 weeks gestation of pregnancy  - routine PNC   2. Encounter for vaccination  -     Tdap (BOOSTRIX) vaccine injection 0.5 mL         Reviewed warning signs, normal FKCs,  labor precautions and how/when to call. Patient states understanding  RTC x 2 wks, call or present sooner prn.

## 2025-05-16 NOTE — NURSING
Patient is here for encounter for TDAP     Verified patient with 2 patient identifiers. Allergies and medications reviewed.   TDAP given IM to Left Deltoid using aseptic technique.   No discomfort noted. Patient tolerated well.     Next appointment scheduled.     Patient advised to wait 15 minutes in lobby to monitor for reaction.   Patient verbalized understanding.

## 2025-05-23 ENCOUNTER — RESULTS FOLLOW-UP (OUTPATIENT)
Dept: OBSTETRICS AND GYNECOLOGY | Facility: CLINIC | Age: 20
End: 2025-05-23

## 2025-05-23 ENCOUNTER — PATIENT MESSAGE (OUTPATIENT)
Dept: OBSTETRICS AND GYNECOLOGY | Facility: CLINIC | Age: 20
End: 2025-05-23
Payer: MEDICAID

## 2025-05-23 DIAGNOSIS — O99.810 ABNORMAL O'SULLIVAN GLUCOSE CHALLENGE TEST, ANTEPARTUM: Primary | ICD-10-CM

## 2025-05-30 ENCOUNTER — ROUTINE PRENATAL (OUTPATIENT)
Dept: OBSTETRICS AND GYNECOLOGY | Facility: CLINIC | Age: 20
End: 2025-05-30
Payer: COMMERCIAL

## 2025-05-30 VITALS
SYSTOLIC BLOOD PRESSURE: 114 MMHG | BODY MASS INDEX: 36.73 KG/M2 | DIASTOLIC BLOOD PRESSURE: 78 MMHG | WEIGHT: 200.81 LBS

## 2025-05-30 DIAGNOSIS — O99.810 ABNORMAL O'SULLIVAN GLUCOSE CHALLENGE TEST, ANTEPARTUM: ICD-10-CM

## 2025-05-30 DIAGNOSIS — Z36.89 ENCOUNTER FOR ULTRASOUND TO ASSESS FETAL GROWTH: ICD-10-CM

## 2025-05-30 DIAGNOSIS — Z34.03 SUPERVISION OF NORMAL FIRST TEEN PREGNANCY IN THIRD TRIMESTER: Primary | ICD-10-CM

## 2025-05-30 DIAGNOSIS — Z34.02 ENCOUNTER FOR SUPERVISION OF NORMAL FIRST PREGNANCY IN SECOND TRIMESTER: ICD-10-CM

## 2025-05-30 PROCEDURE — 99999 PR PBB SHADOW E&M-EST. PATIENT-LVL III: CPT | Mod: PBBFAC,,, | Performed by: MIDWIFE

## 2025-05-30 NOTE — PROGRESS NOTES
19 y.o. female  at 30w5d   Reports + FM, denies VB, LOF or CTX  Doing well without concerns.  TW lbs     Supervision of normal first teen pregnancy in third trimester  -     Reviewed 28wk lab results (O POS), failed 1hr gtt, 3hr ordered but not completed. Discussed importance of completing 3hr gtt to determine GDM. Pt states unable to complete today, but will return 6/3/25 for completion. Reviewed GDM and importance of detection in pregnancy.  -Growth US scheduled for nv at 32w    Abnormal O'Carrillo glucose challenge test, antepartum  Overview:  Failed 1 hour; needs 3 hour GTT  Declines testing today, will return 6/3/25 for 3hr gtt       Reviewed warning signs, normal FKCs,  labor precautions and how/when to call.  RTC x 2 wks, call or present sooner prn.

## 2025-06-04 ENCOUNTER — LAB VISIT (OUTPATIENT)
Dept: LAB | Facility: HOSPITAL | Age: 20
End: 2025-06-04
Payer: COMMERCIAL

## 2025-06-04 DIAGNOSIS — O99.810 ABNORMAL O'SULLIVAN GLUCOSE CHALLENGE TEST, ANTEPARTUM: ICD-10-CM

## 2025-06-04 LAB
EAG (OHS): 105 MG/DL (ref 68–131)
GLUCOSE SERPL-MCNC: 115 MG/DL (ref 70–139)
GLUCOSE SERPL-MCNC: 130 MG/DL (ref 70–154)
GLUCOSE SERPL-MCNC: 183 MG/DL (ref 70–179)
GLUCOSE SERPL-MCNC: 71 MG/DL (ref 70–94)
HBA1C MFR BLD: 5.3 % (ref 4–5.6)

## 2025-06-04 PROCEDURE — 82947 ASSAY GLUCOSE BLOOD QUANT: CPT

## 2025-06-04 PROCEDURE — 83036 HEMOGLOBIN GLYCOSYLATED A1C: CPT

## 2025-06-04 PROCEDURE — 36415 COLL VENOUS BLD VENIPUNCTURE: CPT

## 2025-06-04 PROCEDURE — 82951 GLUCOSE TOLERANCE TEST (GTT): CPT

## 2025-06-04 PROCEDURE — 82950 GLUCOSE TEST: CPT

## 2025-06-05 ENCOUNTER — RESULTS FOLLOW-UP (OUTPATIENT)
Dept: OBSTETRICS AND GYNECOLOGY | Facility: HOSPITAL | Age: 20
End: 2025-06-05

## 2025-06-08 ENCOUNTER — PATIENT MESSAGE (OUTPATIENT)
Dept: OTHER | Facility: OTHER | Age: 20
End: 2025-06-08
Payer: COMMERCIAL

## 2025-06-13 ENCOUNTER — HOSPITAL ENCOUNTER (INPATIENT)
Facility: HOSPITAL | Age: 20
LOS: 7 days | Discharge: HOME | End: 2025-06-20
Attending: OBSTETRICS & GYNECOLOGY | Admitting: OBSTETRICS & GYNECOLOGY
Payer: COMMERCIAL

## 2025-06-13 ENCOUNTER — PROCEDURE VISIT (OUTPATIENT)
Dept: OBSTETRICS AND GYNECOLOGY | Facility: CLINIC | Age: 20
End: 2025-06-13
Payer: COMMERCIAL

## 2025-06-13 ENCOUNTER — ROUTINE PRENATAL (OUTPATIENT)
Dept: OBSTETRICS AND GYNECOLOGY | Facility: CLINIC | Age: 20
End: 2025-06-13
Payer: COMMERCIAL

## 2025-06-13 VITALS
WEIGHT: 201.06 LBS | DIASTOLIC BLOOD PRESSURE: 72 MMHG | SYSTOLIC BLOOD PRESSURE: 126 MMHG | BODY MASS INDEX: 36.77 KG/M2

## 2025-06-13 DIAGNOSIS — Z34.03 SUPERVISION OF NORMAL FIRST TEEN PREGNANCY IN THIRD TRIMESTER: ICD-10-CM

## 2025-06-13 DIAGNOSIS — Z3A.32 32 WEEKS GESTATION OF PREGNANCY: Primary | ICD-10-CM

## 2025-06-13 DIAGNOSIS — O99.810 ABNORMAL O'SULLIVAN GLUCOSE CHALLENGE TEST, ANTEPARTUM: ICD-10-CM

## 2025-06-13 DIAGNOSIS — Z98.891 S/P CESAREAN SECTION: Primary | ICD-10-CM

## 2025-06-13 DIAGNOSIS — O36.5990 PREGNANCY AFFECTED BY FETAL GROWTH RESTRICTION: ICD-10-CM

## 2025-06-13 DIAGNOSIS — O36.8390 NON-REASSURING FETAL HEART TONES COMPLICATING PREGNANCY, ANTEPARTUM: ICD-10-CM

## 2025-06-13 DIAGNOSIS — O36.5990 FETAL GROWTH RESTRICTION ANTEPARTUM: ICD-10-CM

## 2025-06-13 DIAGNOSIS — Z36.89 ENCOUNTER FOR ULTRASOUND TO ASSESS FETAL GROWTH: ICD-10-CM

## 2025-06-13 PROBLEM — O16.3 ELEVATED BLOOD PRESSURE AFFECTING PREGNANCY IN THIRD TRIMESTER, ANTEPARTUM: Status: ACTIVE | Noted: 2025-06-13

## 2025-06-13 LAB
ABSOLUTE EOSINOPHIL (OHS): 0.05 K/UL
ABSOLUTE MONOCYTE (OHS): 0.77 K/UL (ref 0.3–1)
ABSOLUTE NEUTROPHIL COUNT (OHS): 10.33 K/UL (ref 1.8–7.7)
ALBUMIN SERPL BCP-MCNC: 2.8 G/DL (ref 3.5–5.2)
ALP SERPL-CCNC: 85 UNIT/L (ref 40–150)
ALT SERPL W/O P-5'-P-CCNC: 11 UNIT/L (ref 10–44)
ANION GAP (OHS): 10 MMOL/L (ref 8–16)
AST SERPL-CCNC: 12 UNIT/L (ref 11–45)
BASOPHILS # BLD AUTO: 0.02 K/UL
BASOPHILS NFR BLD AUTO: 0.1 %
BILIRUB SERPL-MCNC: 0.2 MG/DL (ref 0.1–1)
BUN SERPL-MCNC: 9 MG/DL (ref 6–20)
CALCIUM SERPL-MCNC: 8.5 MG/DL (ref 8.7–10.5)
CHLORIDE SERPL-SCNC: 108 MMOL/L (ref 95–110)
CO2 SERPL-SCNC: 20 MMOL/L (ref 23–29)
CREAT SERPL-MCNC: 0.6 MG/DL (ref 0.5–1.4)
CREAT UR-MCNC: 205.3 MG/DL (ref 15–325)
ERYTHROCYTE [DISTWIDTH] IN BLOOD BY AUTOMATED COUNT: 13.3 % (ref 11.5–14.5)
GFR SERPLBLD CREATININE-BSD FMLA CKD-EPI: >60 ML/MIN/1.73/M2
GLUCOSE SERPL-MCNC: 118 MG/DL (ref 70–110)
HCT VFR BLD AUTO: 33.8 % (ref 37–48.5)
HGB BLD-MCNC: 11.5 GM/DL (ref 12–16)
IMM GRANULOCYTES # BLD AUTO: 0.05 K/UL (ref 0–0.04)
IMM GRANULOCYTES NFR BLD AUTO: 0.4 % (ref 0–0.5)
LYMPHOCYTES # BLD AUTO: 2.5 K/UL (ref 1–4.8)
MCH RBC QN AUTO: 29.9 PG (ref 27–31)
MCHC RBC AUTO-ENTMCNC: 34 G/DL (ref 32–36)
MCV RBC AUTO: 88 FL (ref 82–98)
NUCLEATED RBC (/100WBC) (OHS): 0 /100 WBC
PLATELET # BLD AUTO: 235 K/UL (ref 150–450)
PMV BLD AUTO: 10.3 FL (ref 9.2–12.9)
POTASSIUM SERPL-SCNC: 3.4 MMOL/L (ref 3.5–5.1)
PROT SERPL-MCNC: 6.6 GM/DL (ref 6–8.4)
PROT UR-MCNC: 18 MG/DL
PROT/CREAT UR: 0.09 MG/G{CREAT}
RBC # BLD AUTO: 3.84 M/UL (ref 4–5.4)
RELATIVE EOSINOPHIL (OHS): 0.4 %
RELATIVE LYMPHOCYTE (OHS): 18.2 % (ref 18–48)
RELATIVE MONOCYTE (OHS): 5.6 % (ref 4–15)
RELATIVE NEUTROPHIL (OHS): 75.3 % (ref 38–73)
SODIUM SERPL-SCNC: 138 MMOL/L (ref 136–145)
WBC # BLD AUTO: 13.72 K/UL (ref 3.9–12.7)

## 2025-06-13 PROCEDURE — 99999 PR PBB SHADOW E&M-EST. PATIENT-LVL III: CPT | Mod: PBBFAC,,,

## 2025-06-13 PROCEDURE — 76820 UMBILICAL ARTERY ECHO: CPT | Mod: S$GLB,,, | Performed by: OBSTETRICS & GYNECOLOGY

## 2025-06-13 PROCEDURE — 51702 INSERT TEMP BLADDER CATH: CPT

## 2025-06-13 PROCEDURE — 76819 FETAL BIOPHYS PROFIL W/O NST: CPT | Mod: S$GLB,,, | Performed by: OBSTETRICS & GYNECOLOGY

## 2025-06-13 PROCEDURE — 63600175 PHARM REV CODE 636 W HCPCS: Performed by: OBSTETRICS & GYNECOLOGY

## 2025-06-13 PROCEDURE — 99222 1ST HOSP IP/OBS MODERATE 55: CPT | Mod: ,,, | Performed by: OBSTETRICS & GYNECOLOGY

## 2025-06-13 PROCEDURE — 80053 COMPREHEN METABOLIC PANEL: CPT | Performed by: OBSTETRICS & GYNECOLOGY

## 2025-06-13 PROCEDURE — 72100002 HC LABOR CARE, 1ST 8 HOURS

## 2025-06-13 PROCEDURE — 11000001 HC ACUTE MED/SURG PRIVATE ROOM

## 2025-06-13 PROCEDURE — 82570 ASSAY OF URINE CREATININE: CPT | Performed by: OBSTETRICS & GYNECOLOGY

## 2025-06-13 PROCEDURE — 85025 COMPLETE CBC W/AUTO DIFF WBC: CPT | Performed by: OBSTETRICS & GYNECOLOGY

## 2025-06-13 PROCEDURE — 76816 OB US FOLLOW-UP PER FETUS: CPT | Mod: S$GLB,,, | Performed by: OBSTETRICS & GYNECOLOGY

## 2025-06-13 RX ORDER — PRENATAL WITH FERROUS FUM AND FOLIC ACID 3080; 920; 120; 400; 22; 1.84; 3; 20; 10; 1; 12; 200; 27; 25; 2 [IU]/1; [IU]/1; MG/1; [IU]/1; MG/1; MG/1; MG/1; MG/1; MG/1; MG/1; UG/1; MG/1; MG/1; MG/1; MG/1
1 TABLET ORAL DAILY
Status: DISCONTINUED | OUTPATIENT
Start: 2025-06-14 | End: 2025-06-16

## 2025-06-13 RX ORDER — ONDANSETRON 4 MG/1
4 TABLET, ORALLY DISINTEGRATING ORAL EVERY 8 HOURS PRN
Status: DISCONTINUED | OUTPATIENT
Start: 2025-06-13 | End: 2025-06-13

## 2025-06-13 RX ORDER — DIPHENHYDRAMINE HYDROCHLORIDE 50 MG/ML
25 INJECTION, SOLUTION INTRAMUSCULAR; INTRAVENOUS EVERY 4 HOURS PRN
Status: DISCONTINUED | OUTPATIENT
Start: 2025-06-13 | End: 2025-06-16

## 2025-06-13 RX ORDER — BETAMETHASONE SODIUM PHOSPHATE AND BETAMETHASONE ACETATE 3; 3 MG/ML; MG/ML
12 INJECTION, SUSPENSION INTRA-ARTICULAR; INTRALESIONAL; INTRAMUSCULAR; SOFT TISSUE
Status: COMPLETED | OUTPATIENT
Start: 2025-06-13 | End: 2025-06-14

## 2025-06-13 RX ORDER — ONDANSETRON 8 MG/1
8 TABLET, ORALLY DISINTEGRATING ORAL EVERY 8 HOURS PRN
Status: DISCONTINUED | OUTPATIENT
Start: 2025-06-13 | End: 2025-06-16

## 2025-06-13 RX ORDER — FAMOTIDINE 20 MG/1
20 TABLET, FILM COATED ORAL 2 TIMES DAILY PRN
Status: DISCONTINUED | OUTPATIENT
Start: 2025-06-13 | End: 2025-06-16

## 2025-06-13 RX ORDER — MAGNESIUM SULFATE HEPTAHYDRATE 40 MG/ML
4 INJECTION, SOLUTION INTRAVENOUS ONCE
Status: COMPLETED | OUTPATIENT
Start: 2025-06-13 | End: 2025-06-13

## 2025-06-13 RX ORDER — MAGNESIUM SULFATE HEPTAHYDRATE 40 MG/ML
2 INJECTION, SOLUTION INTRAVENOUS CONTINUOUS
Status: DISCONTINUED | OUTPATIENT
Start: 2025-06-13 | End: 2025-06-16

## 2025-06-13 RX ORDER — SIMETHICONE 80 MG
1 TABLET,CHEWABLE ORAL EVERY 6 HOURS PRN
Status: DISCONTINUED | OUTPATIENT
Start: 2025-06-13 | End: 2025-06-16

## 2025-06-13 RX ORDER — AMOXICILLIN 250 MG
1 CAPSULE ORAL NIGHTLY PRN
Status: DISCONTINUED | OUTPATIENT
Start: 2025-06-13 | End: 2025-06-16

## 2025-06-13 RX ORDER — ACETAMINOPHEN 325 MG/1
650 TABLET ORAL EVERY 6 HOURS PRN
Status: DISCONTINUED | OUTPATIENT
Start: 2025-06-13 | End: 2025-06-16

## 2025-06-13 RX ORDER — DIPHENHYDRAMINE HCL 25 MG
25 CAPSULE ORAL EVERY 4 HOURS PRN
Status: DISCONTINUED | OUTPATIENT
Start: 2025-06-13 | End: 2025-06-16

## 2025-06-13 RX ORDER — SODIUM CHLORIDE, SODIUM LACTATE, POTASSIUM CHLORIDE, CALCIUM CHLORIDE 600; 310; 30; 20 MG/100ML; MG/100ML; MG/100ML; MG/100ML
INJECTION, SOLUTION INTRAVENOUS CONTINUOUS
Status: DISCONTINUED | OUTPATIENT
Start: 2025-06-13 | End: 2025-06-16

## 2025-06-13 RX ORDER — SODIUM CHLORIDE 0.9 % (FLUSH) 0.9 %
10 SYRINGE (ML) INJECTION
Status: DISCONTINUED | OUTPATIENT
Start: 2025-06-13 | End: 2025-06-16

## 2025-06-13 RX ADMIN — BETAMETHASONE SODIUM PHOSPHATE AND BETAMETHASONE ACETATE 12 MG: 3; 3 INJECTION, SUSPENSION INTRA-ARTICULAR; INTRALESIONAL; INTRAMUSCULAR at 07:06

## 2025-06-13 RX ADMIN — MAGNESIUM SULFATE HEPTAHYDRATE 4 G: 40 INJECTION, SOLUTION INTRAVENOUS at 10:06

## 2025-06-13 RX ADMIN — SODIUM CHLORIDE, POTASSIUM CHLORIDE, SODIUM LACTATE AND CALCIUM CHLORIDE: 600; 310; 30; 20 INJECTION, SOLUTION INTRAVENOUS at 10:06

## 2025-06-13 RX ADMIN — MAGNESIUM SULFATE HEPTAHYDRATE 2 G/HR: 40 INJECTION, SOLUTION INTRAVENOUS at 10:06

## 2025-06-13 RX ADMIN — SODIUM CHLORIDE, SODIUM LACTATE, POTASSIUM CHLORIDE, AND CALCIUM CHLORIDE 500 ML: .6; .31; .03; .02 INJECTION, SOLUTION INTRAVENOUS at 10:06

## 2025-06-13 NOTE — PROGRESS NOTES
19 y.o. female  at 32w5d   Reports + FM, denies VB, LOF or CTX  Doing ok. Patient is tearful about US findings. Findings today include FGR 2% with AEDF. Dr. Canales read US who recommends patient to be admitted to  for BMZ and continuous monitoring. States he will notify Dr. Duval of patient and POC  Confirmed with  that she was notified of patient   US reviewed: , vertex, posterior, EFW 1224g (2lb 11oz), 2%ile, AC <1%, AEDF  TW lbs     1. 32 weeks gestation of pregnancy  - routine PNC   2. Abnormal O'Carrillo glucose challenge test, antepartum  Overview:  Failed 1 hour; passed 3 hour GTT      3. Supervision of normal first teen pregnancy in third trimester    4. Fetal growth restriction antepartum  Overview:  25: EFW 2%ile; AEDF           Reviewed warning signs, normal FKCs,  labor precautions and how/when to call. Patient states understanding  RTC 4 days, call or present sooner prn

## 2025-06-14 PROBLEM — O13.3 GESTATIONAL HYPERTENSION, THIRD TRIMESTER: Status: ACTIVE | Noted: 2025-06-13

## 2025-06-14 PROBLEM — O99.810 ABNORMAL O'SULLIVAN GLUCOSE CHALLENGE TEST, ANTEPARTUM: Status: RESOLVED | Noted: 2025-05-23 | Resolved: 2025-06-14

## 2025-06-14 PROBLEM — Z34.03 ENCOUNTER FOR SUPERVISION OF NORMAL FIRST PREGNANCY IN THIRD TRIMESTER: Status: RESOLVED | Noted: 2025-04-04 | Resolved: 2025-06-14

## 2025-06-14 PROBLEM — Z34.03 SUPERVISION OF NORMAL FIRST TEEN PREGNANCY IN THIRD TRIMESTER: Status: RESOLVED | Noted: 2025-05-30 | Resolved: 2025-06-14

## 2025-06-14 PROBLEM — O21.9 NAUSEA AND VOMITING DURING PREGNANCY: Status: RESOLVED | Noted: 2025-04-04 | Resolved: 2025-06-14

## 2025-06-14 LAB
INDIRECT COOMBS: NORMAL
RH BLD: NORMAL
RUPTURE OF MEMBRANE (OHS): NEGATIVE
SPECIMEN OUTDATE: NORMAL

## 2025-06-14 PROCEDURE — 11000001 HC ACUTE MED/SURG PRIVATE ROOM

## 2025-06-14 PROCEDURE — 99231 SBSQ HOSP IP/OBS SF/LOW 25: CPT | Mod: ,,, | Performed by: OBSTETRICS & GYNECOLOGY

## 2025-06-14 PROCEDURE — 72100003 HC LABOR CARE, EA. ADDL. 8 HRS

## 2025-06-14 PROCEDURE — 86850 RBC ANTIBODY SCREEN: CPT | Performed by: OBSTETRICS & GYNECOLOGY

## 2025-06-14 PROCEDURE — 84112 EVAL AMNIOTIC FLUID PROTEIN: CPT | Performed by: OBSTETRICS & GYNECOLOGY

## 2025-06-14 PROCEDURE — 25000003 PHARM REV CODE 250: Performed by: OBSTETRICS & GYNECOLOGY

## 2025-06-14 PROCEDURE — 63600175 PHARM REV CODE 636 W HCPCS: Performed by: OBSTETRICS & GYNECOLOGY

## 2025-06-14 RX ORDER — AZITHROMYCIN 250 MG/1
1000 TABLET, FILM COATED ORAL ONCE
Status: CANCELLED | OUTPATIENT
Start: 2025-06-14 | End: 2025-06-14

## 2025-06-14 RX ORDER — AMOXICILLIN 500 MG/1
500 CAPSULE ORAL EVERY 8 HOURS
Status: CANCELLED | OUTPATIENT
Start: 2025-06-16 | End: 2025-06-21

## 2025-06-14 RX ORDER — AZITHROMYCIN 250 MG/1
1000 TABLET, FILM COATED ORAL ONCE
Status: CANCELLED | OUTPATIENT
Start: 2025-06-19 | End: 2025-06-19

## 2025-06-14 RX ADMIN — SODIUM CHLORIDE, POTASSIUM CHLORIDE, SODIUM LACTATE AND CALCIUM CHLORIDE: 600; 310; 30; 20 INJECTION, SOLUTION INTRAVENOUS at 06:06

## 2025-06-14 RX ADMIN — BETAMETHASONE SODIUM PHOSPHATE AND BETAMETHASONE ACETATE 12 MG: 3; 3 INJECTION, SUSPENSION INTRA-ARTICULAR; INTRALESIONAL; INTRAMUSCULAR at 07:06

## 2025-06-14 RX ADMIN — MAGNESIUM SULFATE HEPTAHYDRATE 2 G/HR: 40 INJECTION, SOLUTION INTRAVENOUS at 03:06

## 2025-06-14 RX ADMIN — SODIUM CHLORIDE, POTASSIUM CHLORIDE, SODIUM LACTATE AND CALCIUM CHLORIDE 350 ML: 600; 310; 30; 20 INJECTION, SOLUTION INTRAVENOUS at 03:06

## 2025-06-14 RX ADMIN — ACETAMINOPHEN 650 MG: 325 TABLET ORAL at 05:06

## 2025-06-14 RX ADMIN — SODIUM CHLORIDE, POTASSIUM CHLORIDE, SODIUM LACTATE AND CALCIUM CHLORIDE: 600; 310; 30; 20 INJECTION, SOLUTION INTRAVENOUS at 07:06

## 2025-06-14 NOTE — HPI
19 y.o. female  at 32w5d EGA, sent from office by [facility] due to fetal growth restriction with absent end diastolic flow on u/s today.  Patient reports normal fetal movement.  No contractions, fluid leakage, or vaginal bleeding.  No PIH symptoms.  No other complaints.

## 2025-06-14 NOTE — SUBJECTIVE & OBJECTIVE
OB History    Para Term  AB Living   1 0 0 0 0 0   SAB IAB Ectopic Multiple Live Births   0 0 0 0 0      # Outcome Date GA Lbr Keagan/2nd Weight Sex Type Anes PTL Lv   1 Current              No past medical history on file.  Past Surgical History:   Procedure Laterality Date    CHONDROPLASTY OF KNEE Right 2022    Procedure: CHONDROPLASTY, KNEE;  Surgeon: Eleazar Del Rosario MD;  Location: [facility] OR;  Service: Orthopedics;  Laterality: Right;    EXCISION OF MEDIAL MENISCUS OF KNEE Right 2022    Procedure: MENISCECTOMY, KNEE, MEDIAL;  Surgeon: Eleazar Del Rosario MD;  Location: [facility] OR;  Service: Orthopedics;  Laterality: Right;    KNEE ARTHROSCOPY W/ MENISCECTOMY Right 2022    Procedure: ARTHROSCOPY, KNEE, WITH MENISCECTOMY;  Surgeon: Eleazar Del Rosario MD;  Location: [facility] OR;  Service: Orthopedics;  Laterality: Right;  medial meniscectomy    RECONSTRUCTION OF ANTERIOR CRUCIATE LIGAMENT USING GRAFT Right 2022    Procedure: RECONSTRUCTION, KNEE, ACL, USING GRAFT;  Surgeon: Eleazar Del Rosario MD;  Location: [facility] OR;  Service: Orthopedics;  Laterality: Right;  cartilage harvest       PTA Medications   Medication Sig    ondansetron (ZOFRAN-ODT) 4 MG TbDL Take 1 tablet (4 mg total) by mouth every 8 (eight) hours as needed (nausea).    pantoprazole (PROTONIX) 20 MG tablet Take 1 tablet (20 mg total) by mouth once daily.    prenatal vit,carl 74/iron/folic (PRENATAL VITAMIN 1+1 ORAL) Take by mouth.    promethazine (PHENERGAN) 25 MG tablet Take 1 tablet (25 mg total) by mouth nightly as needed for Nausea.       Review of patient's allergies indicates:  No Known Allergies     Family History       Problem Relation (Age of Onset)    No Known Problems Mother, Father          Tobacco Use    Smoking status: Never     Passive exposure: Yes    Smokeless tobacco: Never   Substance and Sexual Activity    Alcohol use: No    Drug use: Yes     Types: Marijuana    Sexual activity: Yes     Partners: Male      Review of Systems   Constitutional:  Negative for chills and fever.   Eyes:  Negative for visual disturbance.   Respiratory:  Negative for cough and shortness of breath.    Cardiovascular:  Negative for chest pain and leg swelling.   Gastrointestinal:  Negative for abdominal pain, nausea and vomiting.   Neurological:  Negative for headaches.      Objective:     Vital Signs (Most Recent):  Pulse: 74 (25 1740)  Resp: 18 (25 1720)  BP: (!) 145/79 (25 1800) Vital Signs (24h Range):  Pulse:  [74-90] 74  Resp:  [18] 18  BP: (126-145)/(72-85) 145/79        There is no height or weight on file to calculate BMI.    FHT: 150 Cat 1 (reassuring)  TOCO:  Q 0 minutes     Physical Exam:   Constitutional: She is oriented to person, place, and time. No distress.    HENT:   Head: Normocephalic and atraumatic.      Cardiovascular:  Normal rate.             Pulmonary/Chest: Effort normal.        Abdominal: Soft. There is no abdominal tenderness.             Musculoskeletal: No tenderness or edema.       Neurological: She is alert and oriented to person, place, and time.     Psychiatric: She has a normal mood and affect.             Significant Labs:  Lab Results   Component Value Date    GROUPTRH O POS 2024    HEPBSAG Non-reactive 2024       CBC:   Recent Labs   Lab 25   WBC 13.72*   RBC 3.84*   HGB 11.5*   HCT 33.8*      MCV 88   MCH 29.9   MCHC 34.0     CMP:   Recent Labs   Lab 25   *   CALCIUM 8.5*   ALBUMIN 2.8*   PROT 6.6      K 3.4*   CO2 20*      BUN 9   CREATININE 0.6   ALKPHOS 85   ALT 11   AST 12   BILITOT 0.2     I have personallly reviewed all pertinent lab results from the last 24 hours.    Indication   ========   Indication: Assess Fetal Growth   Indication: Young Maternal Age   Indication: Obesity (Obstetric)   Indication: Fetal Growth Restriction (SGA/IUGR)     History   ======   Previous Outcomes    1   Risk Factors   History  risk factors: Young maternal age   History risk factors: Obesity BMI >35     Current Pregnancy   ==============   No fetal aneuploidy screening done     Fetal Growth Overview   =================   Exam date        GA              BPD (mm)         HC (mm)        AC (mm)        FL (mm)         HL (mm)        EFW (g)   03/7/2025          18w 5d        42.2                 153                126.1                                  25.2   06/13/2025        32w 5d        74.2                  276.6             240.7             52                                      1224    2%     Method   ======   Transabdominal ultrasound examination . 2D Color Doppler.     Pregnancy   =========   Castellanos pregnancy. Number of fetuses: 1     Dating   ======   GA by prior assessment 32 w + 5 d   ANÍBAL by prior assessment: 8/3/2025   Ultrasound examination on: 6/13/2025   GA by U/S based upon: AC, BPD, Femur, HC   GA by U/S 28 w + 6 d   ANÍBAL by U/S: 8/30/2025   Assigned: based on stated ANÍBAL, selected on 03/7/2025   Assigned GA (weeks days) 32 w + 5 d   Assigned ANÍBAL: 8/3/2025     General Evaluation   ==============   Cardiac activity present.  bpm. Fetal movements: visualized. Presentation: cephalic   Placenta: Placental site: posterior   Amniotic fluid: Amount of AF: normal amount. MVP 4.4 cm     Fetal Biometry   ============   Standard   BPD 74.2 mm 29w 5d Hadlock   OFD 98.0 mm 31w 5d Dee   .6 mm 29w 4d Chervenak   .7 mm 28w 2d Hadlock   Femur 52.0 mm 27w 5d Hadlock   HC / AC 1.15   EFW 1,224 g 2% Yoan   EFW (lb) 2 lb   EFW (oz) 11 oz   EFW by: Hadlock (BPD-HC-AC-FL)   Head / Face / Neck   Cephalic index 0.76   Extremities / Bony Struc   FL / BPD 0.70   FL / HC 0.19   FL / AC 0.22   Other Structures    bpm     Fetal Anatomy   ===========   4-chamber view: 4 chambers visualized previously   Stomach: normal   Kidneys: normal   Bladder: normal     Biophysical Profile   ==============   2: Fetal breathing  movements   2: Gross body movements   2: Fetal tone   2: Amniotic fluid volume   8/8 Biophysical profile score   Interpretation: normal     Impression   =========   Castellanos live IUP   Fetal size is consistent with severe fetal growth restriction, with the EFW plotting at the 2% (1224 g) and the AC plotting at the <1%.   A limited repeat fetal anatomic survey appears normal.   The BPP score is normal at 8/8.   The MVP is normal.   Umbilical artery dopplers are abnormal with persistent absent end diastolic flow.   cephalic presentation   Dr. Tatyana Duval was made aware of today's findings.     Recommendation   ==============   Consider inpatient admission for steroids and monitoring.   If further recommendations or counseling are needed, please feel free to place an MFM consult so that a visit may be scheduled.     Limitations   =========   Please note: Prenatal ultrasound studies have limitations. They do not detect all fetal, genetic, placental, and maternal abnormalities. A normal appearing prenatal   ultrasound is reassuring. However, it does not guarantee the absence of an abnormality or predict a normal outcome for the fetus or the mother.                                                       Sonographer: Stew Olivas   Electronically Signed by: Caden Canales at 06/13/2025-16:12

## 2025-06-14 NOTE — PROGRESS NOTES
[facility] - Labor & Delivery  Obstetrics  Antepartum Progress Note    Patient Name: Sandy Aguilar  MRN: [id number]  Admission Date: 2025  Hospital Length of Stay: 1 days  Attending Physician: Lima Duval,*  Primary Care Provider: Katlyn, Primary Doctor    Subjective:     Principal Problem:Pregnancy affected by fetal growth restriction    HPI:  19 y.o. female  at 32w5d EGA, sent from office by [facility] due to fetal growth restriction with absent end diastolic flow on u/s today.  Patient reports normal fetal movement.  No contractions, fluid leakage, or vaginal bleeding.  No PIH symptoms.  No other complaints.    Hospital Course:  Patient admitted due to fetal growth restriction with AEDF on UA doppler.  2025--magnesium sulfate previously started for neuroprotection; bmz series, continous fetal monitoring      Obstetric HPI:  Patient reports None contractions, active fetal movement, absent vaginal bleeding , absent loss of fluid      Objective:     Vital Signs (Most Recent):  Temp: 98.3 °F (36.8 °C) (25 0900)  Pulse: 97 (25 1230)  Resp: 20 (25 1230)  BP: 136/76 (25 1230)  SpO2: 100 % (25 1230) Vital Signs (24h Range):  Temp:  [98.3 °F (36.8 °C)-98.7 °F (37.1 °C)] 98.3 °F (36.8 °C)  Pulse:  [62-99] 97  Resp:  [18-20] 20  SpO2:  [98 %-100 %] 100 %  BP: (101-148)/() 136/76     Weight: 91.2 kg (201 lb 1 oz)  Body mass index is 36.77 kg/m².    FHT: 120Cat 1 (reassuring)  TOCO:  none      Intake/Output Summary (Last 24 hours) at 2025 1250  Last data filed at 2025 1200  Gross per 24 hour   Intake 663.66 ml   Output 2890 ml   Net -2226.34 ml       Cervical Exam:deferred     Significant Labs:  Recent Lab Results         25  0454   25  0231   25  2044   25  1913        Rupture of Membrane   Negative           Albumin       2.8       ALP       85       ALT       11       Anion Gap       10       AST       12       Baso #       0.02        Basophil %       0.1       BILIRUBIN TOTAL       0.2  Comment: For infants and newborns, interpretation of results should be based   on gestational age, weight and in agreement with clinical   observations.    Premature Infant recommended reference ranges:   0-24 hours:  <8.0 mg/dL   24-48 hours: <12.0 mg/dL   3-5 days:    <15.0 mg/dL   6-29 days:   <15.0 mg/dL       BUN       9       Calcium       8.5       Chloride       108       CO2       20       Creatinine       0.6       Urine Creatinine     205.3         eGFR       >60  Comment: Estimated GFR calculated using the CKD-EPI creatinine (2021) equation.       Eos #       0.05       Eos %       0.4       Glucose       118       Gran # (ANC)       10.33       Group & Rh O POS             Hematocrit       33.8       Hemoglobin       11.5       Immature Grans (Abs)       0.05  Comment: Mild elevation in immature granulocytes is non specific and can be seen in a variety of conditions including stress response, acute inflammation, trauma and pregnancy. Correlation with other laboratory and clinical findings is essential.       Immature Granulocytes       0.4       INDIRECT JÚNIOR NEG             Lymph #       2.50       Lymph %       18.2       MCH       29.9       MCHC       34.0       MCV       88       Mono #       0.77       Mono %       5.6       MPV       10.3       Neut %       75.3       nRBC       0       Platelet Count       235       Potassium       3.4       Urine Protein/Creatinine Ratio     0.09         PROTEIN TOTAL       6.6       Urine Protein     18         RBC       3.84       RDW       13.3       Sodium       138       Specimen Outdate 06/17/2025 23:59             WBC       13.72               Physical Exam:   Constitutional: She is oriented to person, place, and time. She appears well-developed and well-nourished.    HENT:   Head: Normocephalic.    Eyes: Pupils are equal, round, and reactive to light. Conjunctivae are normal.     Cardiovascular:   Normal rate and regular rhythm.             Pulmonary/Chest: Effort normal.        Abdominal: Soft.             Musculoskeletal: Normal range of motion and moves all extremeties. No edema.       Neurological: She is alert and oriented to person, place, and time. She has normal reflexes.    Skin: Skin is warm and dry.    Psychiatric: She has a normal mood and affect. Her behavior is normal. Judgment and thought content normal.       Review of Systems  Assessment/Plan:     19 y.o. female  at 32w6d for:    * Pregnancy affected by fetal growth restriction  U/S today shows EFW at second percentile with AEDF.  Betamethasone series ordered  Continuous monitoring for now.  Will repeat UA doppler on Monday, 2025  Continous fetal monitoring  Repeat dopplers on 25  Patient aware if NRFT , will need primary c/section    Gestational hypertension, third trimester  PIH labs ordered.  Continue to monitor blood pressure closely.  2025  Pcr wnl 0.09; creatinine, platelets, lft wnl  Continue to follow closely          Ami Felton MD  Obstetrics  [facility] - [facility] Delivery

## 2025-06-14 NOTE — H&P
[facility] - Labor & Delivery  Obstetrics  History & Physical    Patient Name: Sandy Aguilar  MRN: [id number]  Admission Date: 2025  Primary Care Provider: Katlyn, Primary Doctor    Subjective:     Principal Problem: Fetal growth restriction with AEDF    History of Present Illness:  19 y.o. female  at 32w5d EGA, sent from office by [facility] due to fetal growth restriction with absent end diastolic flow on u/s today.  Patient reports normal fetal movement.  No contractions, fluid leakage, or vaginal bleeding.  No PIH symptoms.  No other complaints.        OB History    Para Term  AB Living   1 0 0 0 0 0   SAB IAB Ectopic Multiple Live Births   0 0 0 0 0      # Outcome Date GA Lbr Keagna/2nd Weight Sex Type Anes PTL Lv   1 Current              No past medical history on file.  Past Surgical History:   Procedure Laterality Date    CHONDROPLASTY OF KNEE Right 2022    Procedure: CHONDROPLASTY, KNEE;  Surgeon: Eleazar Del Rosario MD;  Location: [facility] OR;  Service: Orthopedics;  Laterality: Right;    EXCISION OF MEDIAL MENISCUS OF KNEE Right 2022    Procedure: MENISCECTOMY, KNEE, MEDIAL;  Surgeon: Eleazar Del Rosario MD;  Location: [facility] OR;  Service: Orthopedics;  Laterality: Right;    KNEE ARTHROSCOPY W/ MENISCECTOMY Right 2022    Procedure: ARTHROSCOPY, KNEE, WITH MENISCECTOMY;  Surgeon: Eleazar Del Rosario MD;  Location: [facility] OR;  Service: Orthopedics;  Laterality: Right;  medial meniscectomy    RECONSTRUCTION OF ANTERIOR CRUCIATE LIGAMENT USING GRAFT Right 2022    Procedure: RECONSTRUCTION, KNEE, ACL, USING GRAFT;  Surgeon: Eleazar Del Rosario MD;  Location: [facility] OR;  Service: Orthopedics;  Laterality: Right;  cartilage harvest       PTA Medications   Medication Sig    ondansetron (ZOFRAN-ODT) 4 MG TbDL Take 1 tablet (4 mg total) by mouth every 8 (eight) hours as needed (nausea).    pantoprazole (PROTONIX) 20 MG tablet Take 1 tablet (20 mg total) by mouth once daily.    prenatal vit,carl  74/iron/folic (PRENATAL VITAMIN 1+1 ORAL) Take by mouth.    promethazine (PHENERGAN) 25 MG tablet Take 1 tablet (25 mg total) by mouth nightly as needed for Nausea.       Review of patient's allergies indicates:  No Known Allergies     Family History       Problem Relation (Age of Onset)    No Known Problems Mother, Father          Tobacco Use    Smoking status: Never     Passive exposure: Yes    Smokeless tobacco: Never   Substance and Sexual Activity    Alcohol use: No    Drug use: Yes     Types: Marijuana    Sexual activity: Yes     Partners: Male     Review of Systems   Constitutional:  Negative for chills and fever.   Eyes:  Negative for visual disturbance.   Respiratory:  Negative for cough and shortness of breath.    Cardiovascular:  Negative for chest pain and leg swelling.   Gastrointestinal:  Negative for abdominal pain, nausea and vomiting.   Neurological:  Negative for headaches.      Objective:     Vital Signs (Most Recent):  Pulse: 74 (06/13/25 1740)  Resp: 18 (06/13/25 1720)  BP: (!) 145/79 (06/13/25 1800) Vital Signs (24h Range):  Pulse:  [74-90] 74  Resp:  [18] 18  BP: (126-145)/(72-85) 145/79        There is no height or weight on file to calculate BMI.    FHT: 150 Cat 1 (reassuring)  TOCO:  Q 0 minutes     Physical Exam:   Constitutional: She is oriented to person, place, and time. No distress.    HENT:   Head: Normocephalic and atraumatic.      Cardiovascular:  Normal rate.             Pulmonary/Chest: Effort normal.        Abdominal: Soft. There is no abdominal tenderness.             Musculoskeletal: No tenderness or edema.       Neurological: She is alert and oriented to person, place, and time.     Psychiatric: She has a normal mood and affect.             Significant Labs:  Lab Results   Component Value Date    GROUPTRH O POS 12/17/2024    HEPBSAG Non-reactive 12/17/2024       CBC:   Recent Labs   Lab 06/13/25  1913   WBC 13.72*   RBC 3.84*   HGB 11.5*   HCT 33.8*      MCV 88   MCH  29.9   MCHC 34.0     CMP:   Recent Labs   Lab 25  1913   *   CALCIUM 8.5*   ALBUMIN 2.8*   PROT 6.6      K 3.4*   CO2 20*      BUN 9   CREATININE 0.6   ALKPHOS 85   ALT 11   AST 12   BILITOT 0.2     I have personallly reviewed all pertinent lab results from the last 24 hours.    Indication   ========   Indication: Assess Fetal Growth   Indication: Young Maternal Age   Indication: Obesity (Obstetric)   Indication: Fetal Growth Restriction (SGA/IUGR)     History   ======   Previous Outcomes    1   Risk Factors   History risk factors: Young maternal age   History risk factors: Obesity BMI >35     Current Pregnancy   ==============   No fetal aneuploidy screening done     Fetal Growth Overview   =================   Exam date        GA              BPD (mm)         HC (mm)        AC (mm)        FL (mm)         HL (mm)        EFW (g)   2025          18w 5d        42.2                 153                126.1                                  25.2   2025        32w 5d        74.2                  276.6             240.7             52                                      1224    2%     Method   ======   Transabdominal ultrasound examination . 2D Color Doppler.     Pregnancy   =========   Castellanos pregnancy. Number of fetuses: 1     Dating   ======   GA by prior assessment 32 w + 5 d   ANÍBAL by prior assessment: 8/3/2025   Ultrasound examination on: 2025   GA by U/S based upon: AC, BPD, Femur, HC   GA by U/S 28 w + 6 d   ANÍBAL by U/S: 2025   Assigned: based on stated ANÍBAL, selected on 2025   Assigned GA (weeks days) 32 w + 5 d   Assigned ANÍBAL: 8/3/2025     General Evaluation   ==============   Cardiac activity present.  bpm. Fetal movements: visualized. Presentation: cephalic   Placenta: Placental site: posterior   Amniotic fluid: Amount of AF: normal amount. MVP 4.4 cm     Fetal Biometry   ============   Standard   BPD 74.2 mm 29w 5d Hadlock   OFD 98.0 mm 31w 5d  Dee   .6 mm 29w 4d Chervenak   .7 mm 28w 2d Hadlock   Femur 52.0 mm 27w 5d Hadlock   HC / AC 1.15   EFW 1,224 g 2% Yoan   EFW (lb) 2 lb   EFW (oz) 11 oz   EFW by: Hadlock (BPD-HC-AC-FL)   Head / Face / Neck   Cephalic index 0.76   Extremities / Bony Struc   FL / BPD 0.70   FL / HC 0.19   FL / AC 0.22   Other Structures    bpm     Fetal Anatomy   ===========   4-chamber view: 4 chambers visualized previously   Stomach: normal   Kidneys: normal   Bladder: normal     Biophysical Profile   ==============   2: Fetal breathing movements   2: Gross body movements   2: Fetal tone   2: Amniotic fluid volume   8/8 Biophysical profile score   Interpretation: normal     Impression   =========   Castellanos live IUP   Fetal size is consistent with severe fetal growth restriction, with the EFW plotting at the 2% (1224 g) and the AC plotting at the <1%.   A limited repeat fetal anatomic survey appears normal.   The BPP score is normal at 8/8.   The MVP is normal.   Umbilical artery dopplers are abnormal with persistent absent end diastolic flow.   cephalic presentation   Dr. Tatyana Duval was made aware of today's findings.     Recommendation   ==============   Consider inpatient admission for steroids and monitoring.   If further recommendations or counseling are needed, please feel free to place an MFM consult so that a visit may be scheduled.     Limitations   =========   Please note: Prenatal ultrasound studies have limitations. They do not detect all fetal, genetic, placental, and maternal abnormalities. A normal appearing prenatal   ultrasound is reassuring. However, it does not guarantee the absence of an abnormality or predict a normal outcome for the fetus or the mother.                                                       Sonographer: Stew Olivas   Electronically Signed by: Caden Canales at 2025-16:12   Assessment/Plan:     19 y.o. female  at 32w5d for:    Elevated blood  pressure affecting pregnancy in third trimester, antepartum  PIH labs ordered.  Continue to monitor blood pressure closely.    Fetal growth restriction antepartum  U/S today shows EFW at second percentile with AEDF.  Betamethasone series ordered  Continuous monitoring for now.  Will repeat UA doppler on Monday, 6/16        Lima Duval MD  Obstetrics  [facility] - Labor [facility] Delivery

## 2025-06-14 NOTE — ASSESSMENT & PLAN NOTE
[facility] labs ordered.  Continue to monitor blood pressure closely.  06/14/2025  Pcr wnl 0.09; creatinine, platelets, lft wnl  Continue to follow closely

## 2025-06-14 NOTE — HOSPITAL COURSE
Patient admitted due to fetal growth restriction with AEDF on UA doppler.  06/14/2025--magnesium sulfate previously started for neuroprotection; bmz series, continous fetal monitoring  6/16/25-primary c/section for non reassuring fht, mother transferred to [facility] for routine postpartum care, infant to nicu

## 2025-06-14 NOTE — ASSESSMENT & PLAN NOTE
U/S today shows EFW at second percentile with AEDF.  Betamethasone series ordered  Continuous monitoring for now.  Will repeat UA doppler on Monday, 6/16

## 2025-06-14 NOTE — PROGRESS NOTES
06/13/25 2206   [facility] Note - Strip   Strip Reviewed by Isa Nurse? Yes   [facility] Note - Communication   [place] Nurse Communicated with Bedside Nurse Regarding: Fetal Status   [facility] Note - Notification   Nurse Notified? Yes  (Her nurse & other staff are at the bedside.)

## 2025-06-14 NOTE — ASSESSMENT & PLAN NOTE
U/S today shows EFW at second percentile with AEDF.  Betamethasone series ordered  Continuous monitoring for now.  Will repeat UA doppler on Monday, 6/16 06/14/2025  Continous fetal monitoring  Repeat dopplers on Monday 6/16/25  Patient aware if NRFT , will need primary c/section

## 2025-06-14 NOTE — SUBJECTIVE & OBJECTIVE
Obstetric HPI:  Patient reports None contractions, active fetal movement, absent vaginal bleeding , absent loss of fluid      Objective:     Vital Signs (Most Recent):  Temp: 98.3 °F (36.8 °C) (06/14/25 0900)  Pulse: 97 (06/14/25 1230)  Resp: 20 (06/14/25 1230)  BP: 136/76 (06/14/25 1230)  SpO2: 100 % (06/14/25 1230) Vital Signs (24h Range):  Temp:  [98.3 °F (36.8 °C)-98.7 °F (37.1 °C)] 98.3 °F (36.8 °C)  Pulse:  [62-99] 97  Resp:  [18-20] 20  SpO2:  [98 %-100 %] 100 %  BP: (101-148)/() 136/76     Weight: 91.2 kg (201 lb 1 oz)  Body mass index is 36.77 kg/m².    FHT: 120Cat 1 (reassuring)  TOCO:  none      Intake/Output Summary (Last 24 hours) at 6/14/2025 1250  Last data filed at 6/14/2025 1200  Gross per 24 hour   Intake 663.66 ml   Output 2890 ml   Net -2226.34 ml       Cervical Exam:deferred     Significant Labs:  Recent Lab Results         06/14/25  0454   06/14/25  0231   06/13/25  2044   06/13/25  1913        Rupture of Membrane   Negative           Albumin       2.8       ALP       85       ALT       11       Anion Gap       10       AST       12       Baso #       0.02       Basophil %       0.1       BILIRUBIN TOTAL       0.2  Comment: For infants and newborns, interpretation of results should be based   on gestational age, weight and in agreement with clinical   observations.    Premature Infant recommended reference ranges:   0-24 hours:  <8.0 mg/dL   24-48 hours: <12.0 mg/dL   3-5 days:    <15.0 mg/dL   6-29 days:   <15.0 mg/dL       BUN       9       Calcium       8.5       Chloride       108       CO2       20       Creatinine       0.6       Urine Creatinine     205.3         eGFR       >60  Comment: Estimated GFR calculated using the CKD-EPI creatinine (2021) equation.       Eos #       0.05       Eos %       0.4       Glucose       118       Gran # (ANC)       10.33       Group & Rh O POS             Hematocrit       33.8       Hemoglobin       11.5       Immature Grans (Abs)        0.05  Comment: Mild elevation in immature granulocytes is non specific and can be seen in a variety of conditions including stress response, acute inflammation, trauma and pregnancy. Correlation with other laboratory and clinical findings is essential.       Immature Granulocytes       0.4       INDIRECT JÚNIOR NEG             Lymph #       2.50       Lymph %       18.2       MCH       29.9       MCHC       34.0       MCV       88       Mono #       0.77       Mono %       5.6       MPV       10.3       Neut %       75.3       nRBC       0       Platelet Count       235       Potassium       3.4       Urine Protein/Creatinine Ratio     0.09         PROTEIN TOTAL       6.6       Urine Protein     18         RBC       3.84       RDW       13.3       Sodium       138       Specimen Outdate 06/17/2025 23:59             WBC       13.72               Physical Exam:   Constitutional: She is oriented to person, place, and time. She appears well-developed and well-nourished.    HENT:   Head: Normocephalic.    Eyes: Pupils are equal, round, and reactive to light. Conjunctivae are normal.     Cardiovascular:  Normal rate and regular rhythm.             Pulmonary/Chest: Effort normal.        Abdominal: Soft.             Musculoskeletal: Normal range of motion and moves all extremeties. No edema.       Neurological: She is alert and oriented to person, place, and time. She has normal reflexes.    Skin: Skin is warm and dry.    Psychiatric: She has a normal mood and affect. Her behavior is normal. Judgment and thought content normal.       Review of Systems

## 2025-06-14 NOTE — PLAN OF CARE
VSS, no elevated BP noted. No decelerations noted on EFM. Tolerating Magnesium infusion. Florez in place. Will continue to monitor.

## 2025-06-15 PROCEDURE — 99231 SBSQ HOSP IP/OBS SF/LOW 25: CPT | Mod: ,,, | Performed by: OBSTETRICS & GYNECOLOGY

## 2025-06-15 PROCEDURE — 25000003 PHARM REV CODE 250: Performed by: OBSTETRICS & GYNECOLOGY

## 2025-06-15 PROCEDURE — 11000001 HC ACUTE MED/SURG PRIVATE ROOM

## 2025-06-15 RX ADMIN — PRENATAL VITAMINS-IRON FUMARATE 27 MG IRON-FOLIC ACID 0.8 MG TABLET 1 TABLET: at 10:06

## 2025-06-15 RX ADMIN — SIMETHICONE 80 MG: 80 TABLET, CHEWABLE ORAL at 09:06

## 2025-06-15 NOTE — SUBJECTIVE & OBJECTIVE
Obstetric HPI:  Patient reports None contractions, active fetal movement, absent vaginal bleeding , absent loss of fluid      Objective:     Vital Signs (Most Recent):  Temp: 98 °F (36.7 °C) (06/15/25 0705)  Pulse: 77 (06/15/25 1635)  Resp: 18 (06/15/25 0705)  BP: 133/85 (06/15/25 1605)  SpO2: 100 % (06/15/25 1635) Vital Signs (24h Range):  Temp:  [98 °F (36.7 °C)] 98 °F (36.7 °C)  Pulse:  [] 77  Resp:  [18] 18  SpO2:  [94 %-100 %] 100 %  BP: ()/(40-92) 133/85     Weight: 91.2 kg (201 lb 1 oz)  Body mass index is 36.77 kg/m².    FHT: 150Cat 1 (reassuring)  TOCO:  none      Intake/Output Summary (Last 24 hours) at 6/15/2025 1636  Last data filed at 6/15/2025 0240  Gross per 24 hour   Intake 2939.38 ml   Output 1990 ml   Net 949.38 ml       Cervical Exam:  deferred     Significant Labs:  Recent Lab Results       None            Physical Exam:   Constitutional: She is oriented to person, place, and time. She appears well-developed and well-nourished.    HENT:   Head: Normocephalic.    Eyes: Pupils are equal, round, and reactive to light. Conjunctivae are normal.     Cardiovascular:  Normal rate and regular rhythm.             Pulmonary/Chest: Effort normal.        Abdominal: Soft.     Genitourinary:    Genitourinary Comments: Gravid non tender             Musculoskeletal: Normal range of motion.       Neurological: She is alert and oriented to person, place, and time. She has normal reflexes.    Skin: Skin is warm and dry.    Psychiatric: She has a normal mood and affect. Her behavior is normal. Judgment and thought content normal.       Review of Systems

## 2025-06-15 NOTE — PLAN OF CARE
Problem: Adult Inpatient Plan of Care  Goal: Plan of Care Review  Outcome: Progressing  Goal: Patient-Specific Goal (Individualized)  Outcome: Progressing  Goal: Absence of Hospital-Acquired Illness or Injury  Outcome: Progressing  Goal: Optimal Comfort and Wellbeing  Outcome: Progressing  Goal: Readiness for Transition of Care  Outcome: Progressing     Problem: Diabetes in Pregnancy  Goal: Optimal Coping  Outcome: Progressing  Goal: Blood Glucose Level Within Targeted Range  Outcome: Progressing     Problem:  Fall Injury Risk  Goal: Absence of Fall, Infant Drop and Related Injury  Outcome: Progressing     Problem: Hospitalized  Patient  Goal: Optimal Patient-Fetal Wellbeing  Outcome: Progressing     Problem: Infection  Goal: Absence of Infection Signs and Symptoms  Outcome: Progressing

## 2025-06-15 NOTE — PROGRESS NOTES
[facility] - Labor & Delivery  Obstetrics  Antepartum Progress Note    Patient Name: Sandy Aguilar  MRN: [id number]  Admission Date: 2025  Hospital Length of Stay: 2 days  Attending Physician: Lima Duval,*  Primary Care Provider: Katlyn, Primary Doctor    Subjective:     Principal Problem:Pregnancy affected by fetal growth restriction    HPI:  19 y.o. female  at 32w5d EGA, sent from office by [facility] due to fetal growth restriction with absent end diastolic flow on u/s today.  Patient reports normal fetal movement.  No contractions, fluid leakage, or vaginal bleeding.  No PIH symptoms.  No other complaints.    Hospital Course:  Patient admitted due to fetal growth restriction with AEDF on UA doppler.  2025--magnesium sulfate previously started for neuroprotection; bmz series, continous fetal monitoring      Obstetric HPI:  Patient reports None contractions, active fetal movement, absent vaginal bleeding , absent loss of fluid      Objective:     Vital Signs (Most Recent):  Temp: 98 °F (36.7 °C) (06/15/25 0705)  Pulse: 77 (06/15/25 1635)  Resp: 18 (06/15/25 0705)  BP: 133/85 (06/15/25 1605)  SpO2: 100 % (06/15/25 1635) Vital Signs (24h Range):  Temp:  [98 °F (36.7 °C)] 98 °F (36.7 °C)  Pulse:  [] 77  Resp:  [18] 18  SpO2:  [94 %-100 %] 100 %  BP: ()/(40-92) 133/85     Weight: 91.2 kg (201 lb 1 oz)  Body mass index is 36.77 kg/m².    FHT: 150Cat 1 (reassuring)  TOCO:  none      Intake/Output Summary (Last 24 hours) at 6/15/2025 1636  Last data filed at 6/15/2025 0240  Gross per 24 hour   Intake 2939.38 ml   Output 1990 ml   Net 949.38 ml       Cervical Exam:  deferred     Significant Labs:  Recent Lab Results       None            Physical Exam:   Constitutional: She is oriented to person, place, and time. She appears well-developed and well-nourished.    HENT:   Head: Normocephalic.    Eyes: Pupils are equal, round, and reactive to light. Conjunctivae are normal.     Cardiovascular:   Normal rate and regular rhythm.             Pulmonary/Chest: Effort normal.        Abdominal: Soft.     Genitourinary:    Genitourinary Comments: Gravid non tender             Musculoskeletal: Normal range of motion.       Neurological: She is alert and oriented to person, place, and time. She has normal reflexes.    Skin: Skin is warm and dry.    Psychiatric: She has a normal mood and affect. Her behavior is normal. Judgment and thought content normal.       Review of Systems  Assessment/Plan:     19 y.o. female  at 33w0d for:    * Pregnancy affected by fetal growth restriction  U/S today shows EFW at second percentile with AEDF.  Betamethasone series ordered  Continuous monitoring for now.  Will repeat UA doppler on Monday, 6/16  06/15/2025  Continous fetal monitoring  Repeat dopplers on 25  Patient aware if NRFT , will need primary c/section  06/15/2025  Continous monitoring  S/p bmz x2; s/p magnesium sulfate  Repeat u/a dopplers on Monday and consult with M regarding delivery timing    Gestational hypertension, third trimester  PIH labs ordered.  Continue to monitor blood pressure closely.  06/15/2025  Pcr wnl 0.09; creatinine, platelets, lft wnl  Continue to follow closely  06/15/2025  Bp normal; continue to monitor closely          Ami Felton MD  Obstetrics  [facility] - Labor & Delivery

## 2025-06-15 NOTE — ASSESSMENT & PLAN NOTE
U/S today shows EFW at second percentile with AEDF.  Betamethasone series ordered  Continuous monitoring for now.  Will repeat UA doppler on Monday, 6/16  06/15/2025  Continous fetal monitoring  Repeat dopplers on Monday 6/16/25  Patient aware if NRFT , will need primary c/section  06/15/2025  Continous monitoring  S/p bmz x2; s/p magnesium sulfate  Repeat u/a dopplers on Monday and consult with MFM regarding delivery timing

## 2025-06-15 NOTE — PLAN OF CARE
Problem:  Fall Injury Risk  Goal: Absence of Fall, Infant Drop and Related Injury  Outcome: Not Progressing   Pt fell while attempting to get out of bed. Pt fell to knees and was assisted up by RN. Upon assessment, RN noted small brush burn to L knee and patient reported pain 2/10 to L knee. Pt provided with ice for pain and offered tylenol. Pt refused tylenol. Pt did not hit belly or head at time of fall. VSS. FHR continuously monitored.

## 2025-06-15 NOTE — ASSESSMENT & PLAN NOTE
[facility] labs ordered.  Continue to monitor blood pressure closely.  06/15/2025  Pcr wnl 0.09; creatinine, platelets, lft wnl  Continue to follow closely  06/15/2025  Bp normal; continue to monitor closely

## 2025-06-16 ENCOUNTER — ANESTHESIA (OUTPATIENT)
Dept: OBSTETRICS AND GYNECOLOGY | Facility: HOSPITAL | Age: 20
End: 2025-06-16
Payer: COMMERCIAL

## 2025-06-16 ENCOUNTER — ANESTHESIA EVENT (OUTPATIENT)
Dept: OBSTETRICS AND GYNECOLOGY | Facility: HOSPITAL | Age: 20
End: 2025-06-16
Payer: COMMERCIAL

## 2025-06-16 PROBLEM — O36.8390 NON-REASSURING FETAL HEART RATE OR RHYTHM AFFECTING MANAGEMENT OF MOTHER: Status: ACTIVE | Noted: 2025-06-16

## 2025-06-16 PROBLEM — Z98.891 S/P CESAREAN SECTION: Status: ACTIVE | Noted: 2025-06-16

## 2025-06-16 LAB
ALLENS TEST: ABNORMAL
HCO3 UR-SCNC: 20.2 MMOL/L (ref 17–27)
PCO2 BLDA: 56.9 MMHG (ref 32–66)
PH SMN: 7.16 [PH] (ref 7.18–7.38)
PO2 BLDA: 13 MMHG (ref 6–31)
POC BE: -9 MMOL/L (ref -2–2)
POC SATURATED O2: 9 %
SAMPLE: ABNORMAL
SITE: ABNORMAL

## 2025-06-16 PROCEDURE — 36416 COLLJ CAPILLARY BLOOD SPEC: CPT

## 2025-06-16 PROCEDURE — 99900035 HC TECH TIME PER 15 MIN (STAT)

## 2025-06-16 PROCEDURE — 27200688 HC TRAY, SPINAL-HYPER/ ISOBARIC: Performed by: STUDENT IN AN ORGANIZED HEALTH CARE EDUCATION/TRAINING PROGRAM

## 2025-06-16 PROCEDURE — 59510 CESAREAN DELIVERY: CPT | Mod: AT,,, | Performed by: OBSTETRICS & GYNECOLOGY

## 2025-06-16 PROCEDURE — 88305 TISSUE EXAM BY PATHOLOGIST: CPT | Mod: 26,,, | Performed by: PATHOLOGY

## 2025-06-16 PROCEDURE — 36004725 HC OB OR TIME LEV III - EA ADD 15 MIN: Performed by: OBSTETRICS & GYNECOLOGY

## 2025-06-16 PROCEDURE — A4216 STERILE WATER/SALINE, 10 ML: HCPCS | Performed by: OBSTETRICS & GYNECOLOGY

## 2025-06-16 PROCEDURE — 37000008 HC ANESTHESIA 1ST 15 MINUTES: Performed by: OBSTETRICS & GYNECOLOGY

## 2025-06-16 PROCEDURE — 37000009 HC ANESTHESIA EA ADD 15 MINS: Performed by: OBSTETRICS & GYNECOLOGY

## 2025-06-16 PROCEDURE — 36004724 HC OB OR TIME LEV III - 1ST 15 MIN: Performed by: OBSTETRICS & GYNECOLOGY

## 2025-06-16 PROCEDURE — 25000003 PHARM REV CODE 250: Performed by: OBSTETRICS & GYNECOLOGY

## 2025-06-16 PROCEDURE — 25000003 PHARM REV CODE 250: Performed by: NURSE ANESTHETIST, CERTIFIED REGISTERED

## 2025-06-16 PROCEDURE — 63600175 PHARM REV CODE 636 W HCPCS: Mod: JZ,TB | Performed by: OBSTETRICS & GYNECOLOGY

## 2025-06-16 PROCEDURE — 71000033 HC RECOVERY, INTIAL HOUR: Performed by: OBSTETRICS & GYNECOLOGY

## 2025-06-16 PROCEDURE — 62322 NJX INTERLAMINAR LMBR/SAC: CPT | Performed by: NURSE ANESTHETIST, CERTIFIED REGISTERED

## 2025-06-16 PROCEDURE — 51702 INSERT TEMP BLADDER CATH: CPT

## 2025-06-16 PROCEDURE — 63600175 PHARM REV CODE 636 W HCPCS: Performed by: NURSE ANESTHETIST, CERTIFIED REGISTERED

## 2025-06-16 PROCEDURE — 25000003 PHARM REV CODE 250: Performed by: STUDENT IN AN ORGANIZED HEALTH CARE EDUCATION/TRAINING PROGRAM

## 2025-06-16 PROCEDURE — 82803 BLOOD GASES ANY COMBINATION: CPT

## 2025-06-16 PROCEDURE — 71000039 HC RECOVERY, EACH ADD'L HOUR: Performed by: OBSTETRICS & GYNECOLOGY

## 2025-06-16 PROCEDURE — 11000001 HC ACUTE MED/SURG PRIVATE ROOM

## 2025-06-16 PROCEDURE — 88305 TISSUE EXAM BY PATHOLOGIST: CPT | Mod: TC | Performed by: OBSTETRICS & GYNECOLOGY

## 2025-06-16 RX ORDER — DOCUSATE SODIUM 100 MG/1
100 CAPSULE, LIQUID FILLED ORAL 2 TIMES DAILY
Status: DISCONTINUED | OUTPATIENT
Start: 2025-06-16 | End: 2025-06-20 | Stop reason: HOSPADM

## 2025-06-16 RX ORDER — ONDANSETRON 8 MG/1
8 TABLET, ORALLY DISINTEGRATING ORAL EVERY 8 HOURS PRN
Status: DISCONTINUED | OUTPATIENT
Start: 2025-06-16 | End: 2025-06-20 | Stop reason: HOSPADM

## 2025-06-16 RX ORDER — HYDROCODONE BITARTRATE AND ACETAMINOPHEN 5; 325 MG/1; MG/1
1 TABLET ORAL EVERY 4 HOURS PRN
Status: DISCONTINUED | OUTPATIENT
Start: 2025-06-16 | End: 2025-06-20 | Stop reason: HOSPADM

## 2025-06-16 RX ORDER — OXYTOCIN-SODIUM CHLORIDE 0.9% IV SOLN 30 UNIT/500ML 30-0.9/5 UT/ML-%
SOLUTION INTRAVENOUS CONTINUOUS PRN
Status: DISCONTINUED | OUTPATIENT
Start: 2025-06-16 | End: 2025-06-16

## 2025-06-16 RX ORDER — HYDROMORPHONE HYDROCHLORIDE 2 MG/ML
1 INJECTION, SOLUTION INTRAMUSCULAR; INTRAVENOUS; SUBCUTANEOUS EVERY 4 HOURS PRN
Status: DISCONTINUED | OUTPATIENT
Start: 2025-06-16 | End: 2025-06-20 | Stop reason: HOSPADM

## 2025-06-16 RX ORDER — SODIUM CHLORIDE, SODIUM LACTATE, POTASSIUM CHLORIDE, CALCIUM CHLORIDE 600; 310; 30; 20 MG/100ML; MG/100ML; MG/100ML; MG/100ML
INJECTION, SOLUTION INTRAVENOUS CONTINUOUS
Status: DISCONTINUED | OUTPATIENT
Start: 2025-06-16 | End: 2025-06-16

## 2025-06-16 RX ORDER — OXYTOCIN-SODIUM CHLORIDE 0.9% IV SOLN 30 UNIT/500ML 30-0.9/5 UT/ML-%
95 SOLUTION INTRAVENOUS ONCE AS NEEDED
Status: DISCONTINUED | OUTPATIENT
Start: 2025-06-16 | End: 2025-06-20 | Stop reason: HOSPADM

## 2025-06-16 RX ORDER — MISOPROSTOL 200 UG/1
800 TABLET ORAL ONCE AS NEEDED
Status: DISCONTINUED | OUTPATIENT
Start: 2025-06-16 | End: 2025-06-20 | Stop reason: HOSPADM

## 2025-06-16 RX ORDER — CEFAZOLIN SODIUM 1 G/3ML
INJECTION, POWDER, FOR SOLUTION INTRAMUSCULAR; INTRAVENOUS
Status: DISCONTINUED | OUTPATIENT
Start: 2025-06-16 | End: 2025-06-16

## 2025-06-16 RX ORDER — IBUPROFEN 800 MG/1
800 TABLET, FILM COATED ORAL EVERY 8 HOURS
Status: DISCONTINUED | OUTPATIENT
Start: 2025-06-17 | End: 2025-06-16

## 2025-06-16 RX ORDER — OXYTOCIN-SODIUM CHLORIDE 0.9% IV SOLN 30 UNIT/500ML 30-0.9/5 UT/ML-%
30 SOLUTION INTRAVENOUS ONCE AS NEEDED
Status: DISCONTINUED | OUTPATIENT
Start: 2025-06-16 | End: 2025-06-20 | Stop reason: HOSPADM

## 2025-06-16 RX ORDER — IBUPROFEN 800 MG/1
800 TABLET, FILM COATED ORAL EVERY 8 HOURS
Status: DISCONTINUED | OUTPATIENT
Start: 2025-06-16 | End: 2025-06-20 | Stop reason: HOSPADM

## 2025-06-16 RX ORDER — AMOXICILLIN 250 MG
1 CAPSULE ORAL NIGHTLY
Status: DISCONTINUED | OUTPATIENT
Start: 2025-06-16 | End: 2025-06-20 | Stop reason: HOSPADM

## 2025-06-16 RX ORDER — KETOROLAC TROMETHAMINE 30 MG/ML
30 INJECTION, SOLUTION INTRAMUSCULAR; INTRAVENOUS EVERY 6 HOURS
Status: DISCONTINUED | OUTPATIENT
Start: 2025-06-16 | End: 2025-06-16

## 2025-06-16 RX ORDER — OXYTOCIN-SODIUM CHLORIDE 0.9% IV SOLN 30 UNIT/500ML 30-0.9/5 UT/ML-%
95 SOLUTION INTRAVENOUS CONTINUOUS PRN
Status: DISCONTINUED | OUTPATIENT
Start: 2025-06-16 | End: 2025-06-20 | Stop reason: HOSPADM

## 2025-06-16 RX ORDER — TRANEXAMIC ACID 10 MG/ML
1000 INJECTION, SOLUTION INTRAVENOUS EVERY 30 MIN PRN
Status: DISCONTINUED | OUTPATIENT
Start: 2025-06-16 | End: 2025-06-20 | Stop reason: HOSPADM

## 2025-06-16 RX ORDER — HYDROCORTISONE 25 MG/G
CREAM TOPICAL 3 TIMES DAILY PRN
Status: DISCONTINUED | OUTPATIENT
Start: 2025-06-16 | End: 2025-06-20 | Stop reason: HOSPADM

## 2025-06-16 RX ORDER — SIMETHICONE 80 MG
1 TABLET,CHEWABLE ORAL EVERY 6 HOURS PRN
Status: DISCONTINUED | OUTPATIENT
Start: 2025-06-16 | End: 2025-06-20 | Stop reason: HOSPADM

## 2025-06-16 RX ORDER — ONDANSETRON HYDROCHLORIDE 2 MG/ML
INJECTION, SOLUTION INTRAVENOUS
Status: DISCONTINUED | OUTPATIENT
Start: 2025-06-16 | End: 2025-06-16

## 2025-06-16 RX ORDER — FAMOTIDINE 10 MG/ML
20 INJECTION, SOLUTION INTRAVENOUS
Status: DISCONTINUED | OUTPATIENT
Start: 2025-06-16 | End: 2025-06-16

## 2025-06-16 RX ORDER — OXYTOCIN 10 [USP'U]/ML
10 INJECTION, SOLUTION INTRAMUSCULAR; INTRAVENOUS ONCE AS NEEDED
Status: DISCONTINUED | OUTPATIENT
Start: 2025-06-16 | End: 2025-06-20 | Stop reason: HOSPADM

## 2025-06-16 RX ORDER — PRENATAL WITH FERROUS FUM AND FOLIC ACID 3080; 920; 120; 400; 22; 1.84; 3; 20; 10; 1; 12; 200; 27; 25; 2 [IU]/1; [IU]/1; MG/1; [IU]/1; MG/1; MG/1; MG/1; MG/1; MG/1; MG/1; UG/1; MG/1; MG/1; MG/1; MG/1
1 TABLET ORAL DAILY
Status: DISCONTINUED | OUTPATIENT
Start: 2025-06-16 | End: 2025-06-20 | Stop reason: HOSPADM

## 2025-06-16 RX ORDER — CEFAZOLIN 2 G/1
2 INJECTION, POWDER, FOR SOLUTION INTRAMUSCULAR; INTRAVENOUS ONCE AS NEEDED
Status: DISCONTINUED | OUTPATIENT
Start: 2025-06-16 | End: 2025-06-16

## 2025-06-16 RX ORDER — OXYTOCIN-SODIUM CHLORIDE 0.9% IV SOLN 30 UNIT/500ML 30-0.9/5 UT/ML-%
10 SOLUTION INTRAVENOUS ONCE AS NEEDED
Status: DISCONTINUED | OUTPATIENT
Start: 2025-06-16 | End: 2025-06-20 | Stop reason: HOSPADM

## 2025-06-16 RX ORDER — SODIUM CHLORIDE 0.9 % (FLUSH) 0.9 %
2 SYRINGE (ML) INJECTION EVERY 6 HOURS
Status: DISCONTINUED | OUTPATIENT
Start: 2025-06-16 | End: 2025-06-17

## 2025-06-16 RX ORDER — SODIUM CITRATE AND CITRIC ACID MONOHYDRATE 334; 500 MG/5ML; MG/5ML
30 SOLUTION ORAL
Status: DISCONTINUED | OUTPATIENT
Start: 2025-06-16 | End: 2025-06-16

## 2025-06-16 RX ORDER — LANOLIN ALCOHOL/MO/W.PET/CERES
1 CREAM (GRAM) TOPICAL DAILY
Status: DISCONTINUED | OUTPATIENT
Start: 2025-06-16 | End: 2025-06-20 | Stop reason: HOSPADM

## 2025-06-16 RX ORDER — BUPIVACAINE HYDROCHLORIDE 7.5 MG/ML
INJECTION, SOLUTION EPIDURAL; RETROBULBAR
Status: DISCONTINUED | OUTPATIENT
Start: 2025-06-16 | End: 2025-06-16

## 2025-06-16 RX ORDER — HYDROCODONE BITARTRATE AND ACETAMINOPHEN 7.5; 325 MG/1; MG/1
1 TABLET ORAL EVERY 4 HOURS PRN
Status: DISCONTINUED | OUTPATIENT
Start: 2025-06-16 | End: 2025-06-20 | Stop reason: HOSPADM

## 2025-06-16 RX ORDER — DEXMEDETOMIDINE HYDROCHLORIDE 100 UG/ML
INJECTION, SOLUTION INTRAVENOUS
Status: DISCONTINUED | OUTPATIENT
Start: 2025-06-16 | End: 2025-06-16

## 2025-06-16 RX ORDER — MORPHINE SULFATE 1 MG/ML
INJECTION, SOLUTION EPIDURAL; INTRATHECAL; INTRAVENOUS
Status: DISCONTINUED | OUTPATIENT
Start: 2025-06-16 | End: 2025-06-16

## 2025-06-16 RX ORDER — METHYLERGONOVINE MALEATE 0.2 MG/ML
200 INJECTION INTRAVENOUS ONCE AS NEEDED
Status: DISCONTINUED | OUTPATIENT
Start: 2025-06-16 | End: 2025-06-20 | Stop reason: HOSPADM

## 2025-06-16 RX ORDER — PHENYLEPHRINE HYDROCHLORIDE 10 MG/ML
INJECTION INTRAVENOUS
Status: DISCONTINUED | OUTPATIENT
Start: 2025-06-16 | End: 2025-06-16

## 2025-06-16 RX ORDER — DIPHENHYDRAMINE HCL 25 MG
25 CAPSULE ORAL EVERY 4 HOURS PRN
Status: DISCONTINUED | OUTPATIENT
Start: 2025-06-16 | End: 2025-06-20 | Stop reason: HOSPADM

## 2025-06-16 RX ORDER — KETOROLAC TROMETHAMINE 30 MG/ML
INJECTION, SOLUTION INTRAMUSCULAR; INTRAVENOUS
Status: DISCONTINUED | OUTPATIENT
Start: 2025-06-16 | End: 2025-06-16

## 2025-06-16 RX ADMIN — DOCUSATE SODIUM 100 MG: 100 CAPSULE, LIQUID FILLED ORAL at 08:06

## 2025-06-16 RX ADMIN — SENNOSIDES AND DOCUSATE SODIUM 1 TABLET: 50; 8.6 TABLET ORAL at 08:06

## 2025-06-16 RX ADMIN — Medication 334 ML/HR: at 02:06

## 2025-06-16 RX ADMIN — IBUPROFEN 800 MG: 800 TABLET, FILM COATED ORAL at 06:06

## 2025-06-16 RX ADMIN — DEXMEDETOMIDINE 5 MCG: 200 INJECTION, SOLUTION INTRAVENOUS at 01:06

## 2025-06-16 RX ADMIN — BUPIVACAINE HYDROCHLORIDE 1.4 ML: 7.5 INJECTION, SOLUTION EPIDURAL; RETROBULBAR at 01:06

## 2025-06-16 RX ADMIN — Medication 2 ML: at 08:06

## 2025-06-16 RX ADMIN — MORPHINE SULFATE 0.15 MG: 1 INJECTION, SOLUTION EPIDURAL; INTRATHECAL; INTRAVENOUS at 01:06

## 2025-06-16 RX ADMIN — CEFAZOLIN 2 G: 330 INJECTION, POWDER, FOR SOLUTION INTRAMUSCULAR; INTRAVENOUS at 02:06

## 2025-06-16 RX ADMIN — Medication 2 ML: at 06:06

## 2025-06-16 RX ADMIN — PRENATAL VITAMINS-IRON FUMARATE 27 MG IRON-FOLIC ACID 0.8 MG TABLET 1 TABLET: at 08:06

## 2025-06-16 RX ADMIN — PHENYLEPHRINE HYDROCHLORIDE 100 MCG: 10 INJECTION INTRAVENOUS at 01:06

## 2025-06-16 RX ADMIN — HYDROCODONE BITARTRATE AND ACETAMINOPHEN 1 TABLET: 5; 325 TABLET ORAL at 09:06

## 2025-06-16 RX ADMIN — Medication 2 ML: at 12:06

## 2025-06-16 RX ADMIN — KETOROLAC TROMETHAMINE 30 MG: 30 INJECTION, SOLUTION INTRAMUSCULAR; INTRAVENOUS at 02:06

## 2025-06-16 RX ADMIN — SODIUM CHLORIDE, SODIUM LACTATE, POTASSIUM CHLORIDE, AND CALCIUM CHLORIDE: .6; .31; .03; .02 INJECTION, SOLUTION INTRAVENOUS at 01:06

## 2025-06-16 RX ADMIN — FERROUS SULFATE TAB 325 MG (65 MG ELEMENTAL FE) 1 EACH: 325 (65 FE) TAB at 08:06

## 2025-06-16 RX ADMIN — KETOROLAC TROMETHAMINE 30 MG: 30 INJECTION, SOLUTION INTRAMUSCULAR; INTRAVENOUS at 08:06

## 2025-06-16 RX ADMIN — ONDANSETRON 8 MG: 2 INJECTION INTRAMUSCULAR; INTRAVENOUS at 01:06

## 2025-06-16 NOTE — ANESTHESIA POSTPROCEDURE EVALUATION
Anesthesia Post Evaluation    Patient: Sandy Aguilar    Procedure(s) Performed: Procedure(s) (LRB):   SECTION (N/A)    Final Anesthesia Type: spinal      Patient location during evaluation: labor & delivery  Patient participation: Yes- Able to Participate  Level of consciousness: awake and alert  Post-procedure vital signs: reviewed and stable  Pain management: adequate  Airway patency: patent    PONV status at discharge: No PONV  Anesthetic complications: no      Cardiovascular status: blood pressure returned to baseline  Respiratory status: unassisted and spontaneous ventilation  Hydration status: euvolemic  Follow-up not needed.              Vitals Value Taken Time   /57 25 05:04   Temp 36.7 °C (98.1 °F) 25 05:00   Pulse 55 25 05:04   Resp 18 25 05:00   SpO2 100 % 25 04:53   Vitals shown include unfiled device data.      Event Time   Out of Recovery 2025 05:15:00         Pain/Wendy Score: No data recorded

## 2025-06-16 NOTE — PLAN OF CARE
Continuous EFM. Decels resolved w/ position changes. Pt free of falls. Call light in reach. Side rails x 2. Pt denies pain. Pt repositions independently. VTE prophylaxis-SCD's. VSS. Chart reviewed.

## 2025-06-16 NOTE — PROGRESS NOTES
[facility] - Labor & Delivery  Obstetrics  Antepartum Progress Note    Patient Name: Sandy Aguilar  MRN: [id number]  Admission Date: 2025  Hospital Length of Stay: 3 days  Attending Physician: Lima Duval,*  Primary Care Provider: Katlyn, Primary Doctor    Subjective:     Principal Problem:Pregnancy affected by fetal growth restriction    HPI:  19 y.o. female  at 32w5d EGA, sent from office by [facility] due to fetal growth restriction with absent end diastolic flow on u/s today.  Patient reports normal fetal movement.  No contractions, fluid leakage, or vaginal bleeding.  No PIH symptoms.  No other complaints.    Hospital Course:  Patient admitted due to fetal growth restriction with AEDF on UA doppler.  2025--magnesium sulfate previously started for neuroprotection; bmz series, continous fetal monitoring      No new subjective & objective note has been filed under this hospital service since the last note was generated.    Assessment/Plan:     19 y.o. female  at 33w1d for:    * Pregnancy affected by fetal growth restriction  U/S today shows EFW at second percentile with AEDF.  Betamethasone series ordered  Continuous monitoring for now.  Will repeat UA doppler on Monday, 6/16  06/15/2025  Continous fetal monitoring  Repeat dopplers on 25  Patient aware if NRFT , will need primary c/section  06/15/2025  Continous monitoring  S/p bmz x2; s/p magnesium sulfate  Repeat u/a dopplers on Monday and consult with [facility] regarding delivery timing    Non-reassuring fetal heart rate or rhythm affecting management of mother  2025  Called to evaluate fht  Variable decels repetative, some appearing late, others with camille to 90's  Despite position changes, no improvement of fetal heart tones  Recommend proceed with primary c/section  Ancef preop  Nicu aware  Reviewed risks of c/section, including but not limited to:   further infection, damage to bowel, bladder, dvt, htn, cva; damage to fetus.   All questions answered  Consent signed and witnessed; OR/anesthesia aware     Gestational hypertension, third trimester  PIH labs ordered.  Continue to monitor blood pressure closely.  06/15/2025  Pcr wnl 0.09; creatinine, platelets, lft wnl  Continue to follow closely  06/15/2025  Bp normal; continue to monitor closely          Ami Felton MD  Obstetrics  [facility] - Labor [facility] Delivery

## 2025-06-16 NOTE — DISCHARGE INSTRUCTIONS
"Mother Self Care:    Activity: Avoid strenuous exercise and get adequate rest.  No driving until the physician consent given.  Emotional Changes: Most women find birth to be a time of great emotional upheaval.  Sense of loss, mood swings, fatigue, anxiety, and feeling "let down" are common.  If feelings worsen or last more than a week, call your physician.  Breast Care/Bottle Feeding: Wear support bra 24 hours a day for one week.  Avoid stimulation to breasts.  You may use ice packs for discomfort.   Section/Tubal Ligation: Keep incision clean and dry. You may shower, but avoid baths. Please continue to use Nozin twice a day for 7 days after surgery. Ensure you attend your 1 week post op visit to have your dressing removed. Use antibacterial soap to wash your entire body until directed otherwise by a Physician, it is very important to shower daily. If you become concerned about the way your incision site looks, please contact your providers office.   Sexual Activity/Pelvic Rest: No sexual activity, tampons, or douching until your physician gives you consent.  Diet: Continue to eat from the five basic food groups, including plenty of protein, fruits, vegetables, and whole grains.  Limit empty calories and high fat foods.  Drink enough fluids to satisfy thirst and add an extra 500 calories for breastfeeding.  Constipation/Hemorrhoids: Drink plenty of water.  You may take a stool softener or natural laxative (Metamucil). You may use tucks or hemorrhoid ointment and soak in a warm tub.    "What does help look like to you when you go home?"  "Is there any need that you anticipate that we may be able to assist with?"    CALL YOUR OB DOCTOR IF ANY OF THE FOLLOWING OCCURS:  *Heavy bleeding - saturating a pad an hour or passing any large (2-3 inches in size) blood clots.  *Any pain, redness, or tenderness in lower leg.  *You cannot care for yourself or your baby.  *Any signs of infection-      - Temperature greater " than 100.5 degrees F      - Foul smelling vaginal discharge and/or incisional drainage      - Increased episiotomy or incisional pain      - Hot, hard, red or sore area on breast      - Flu-like symptoms      - Any urgency, frequency or burning with urination    Return To the Hospital for further Evaluation:  Headache not relieved by tylenol or ibuprofen  Blurry vision, double vision, seeing spots, or flashing lights  Feeling faint or passing out  Right epigastric pain  Difficulty breathing  Swelling in hands, face, or feet  Any of these symptoms accompanied by nausea/vomiting  Gaining more than 5 pounds in one week  Seizures  These symptoms could be an indication of elevated blood pressure.     For patients that were treated for high blood pressure during pregnancy and or your hospital stay you will need a blood pressure check three days after you leave the hospital. Your nursing staff will assist you in an appointment if needed. If you have Connected Mom you may use your blood pressure cuff and report any readings 140/90 to your provider immediately.       If you have any questions that need to be answered immediately please call the Labor & Delivery Unit at [phone] and ask to speak to a nurse.      Please see [facility] folder for additional information and handouts.

## 2025-06-16 NOTE — NURSING
ABD pressure dressing removed with ease. Florez balloon deflated and removed at this time. Patient perineum was cleansed and she was helped to the restroom. Patient tolerated well and c/o no pain at this time. Patient asked to be shown to the NICU and desires to see baby.     Patient was walked to the NICU where she visited with her baby.

## 2025-06-16 NOTE — ASSESSMENT & PLAN NOTE
06/16/2025  Called to evaluate fht  Variable decels repetative, some appearing late, others with camille to 90's  Despite position changes, no improvement of fetal heart tones  Recommend proceed with primary c/section  Ancef preop  Nicu aware  Reviewed risks of c/section, including but not limited to:   further infection, damage to bowel, bladder, dvt, htn, cva; damage to fetus.  All questions answered  Consent signed and witnessed; OR/anesthesia aware

## 2025-06-16 NOTE — NURSING
Received report from Grace FARFAN. Upon assessment: fundus firm, bleeding is light-minimal. Pt denies pain at this time. Florez cath is in place. Bedrest is to end at 1400 . Pt was educated on CHG wipe use and nozin use. She verbalized understanding. VS are stable. Pt aware to call if assistance is needed. Pt verbalized understanding. Family at the bedside.

## 2025-06-16 NOTE — LACTATION NOTE
Pt states she wants to rest for now and try pumping in a few hours. Reviewed availability of assistance. Alli COOPER.

## 2025-06-16 NOTE — L&D DELIVERY NOTE
Belkys Gonzalez is requesting refill(s) allegra  Last OV 11/16/21 (pertaining to medication)  LR 3/17/22 (per medication requested)  Next office visit scheduled or attempted Yes   If no, reason:  6/6/22 [facility]   Section   Operative Note    SUMMARY     Date of Procedure: 2025     Procedure: Procedure(s) (LRB):   SECTION (N/A)    Surgeons and Role:     * Ami Felton MD - Primary    Assistant:  johnson melendez    Pre-Operative Diagnosis: Fetal Intolerance of Labor  iugr  Post-Operative Diagnosis: Post-Op Diagnosis Codes:     * Pregnant [Z34.90]    Anesthesia: Spinal/Epidural    Technical Procedures Used: primary low transverse  section           Description of the Findings of the Procedure: vertex, clear fluid, nuchal cord x 1, flat, undercoiled cord, normal adnexa    Significant Surgical Tasks Conducted by the Assistant(s), if Applicable: 1st assist    Complications: No    Blood Loss: * No values recorded between 2025  1:43 AM and 2025  2:50 AM *450     With patient in supine position, the legs are  and Florez Catheter placed and positioning to supine done.   Abdomen prepped with Chloroprep and 3 minute drying time allowed prior to draping of the abdomen.   Time out taken with OR team members.  Pfannenstiel Incision made through the skin, transverse fascial incision developed, rectus muscles  in the midline and the peritoneum entered.   no adhesions noted.  The lower uterine segment and position of the fetus identified.   Bladder flap taken down through transverse peritoneal incision.    Low Transverse Incision made through well developer lower uterine segment and extended laterally with blunt dissection.   Clear fluid noted.  Infant delivered from vertex presentation.  Cord   immediately clamped and  handed to attending nurse. Cord gas obtained,   Cord blood could not be obtained.   placenta delivered.  The uterus was exteriorized.  The edges of the uterine incision are grasped with Saleem clamps at the angles and the inferior and superior midline edges of the incision.    Closure with running lock 1 monocryl, starting at each  angle, tying in the midline.   Observation for bleeding with suture of any bleeding along the hysterotomy line.     Right and left adnexa with normal anatomy.       The uterus was then returned to the abdomen, gutters cleared of all clots and debris.  The hysterotomy incisions were examined and noted to be hemostatic.     Closure of the abdomen with 2 0 Vicryl running of the peritoneum,     The fascia was closed with 0 vicryl starting at each angle and tying the knot in the midline after locking the first.  Subcutaneous tissue was copiously irrigated and made hemostatic with cautery. Subcutaneous space closed with 2-0 plain in layers.       Skin closure with 4 0 monocryl subcuticular.  Wound dressed with aquasel and pressure dressing applied.          Specimens:   Specimen (24h ago, onward)      placenta            Condition: Good    VTE Risk Mitigation (From admission, onward)           Ordered     IP VTE LOW RISK PATIENT  Once         25 0249     Place sequential compression device  Until discontinued         25 0249     Place sequential compression device  Until discontinued         25 0244     Place sequential compression device  Until discontinued         25 2223     Place sequential compression device  Until discontinued         25 1818                    Disposition: PACU - hemodynamically stable.    Attestation: Good         Delivery Information for Carlos Aguilar    Birth information:  YOB: 2025   Time of birth: 2:07 AM   Sex: male   Head Delivery Date/Time: 2025  2:07 AM   Delivery type: , Low Transverse   Gestational Age: 33w1d       Delivery Providers    Delivering clinician: Ami Felton MD   Provider Role    Staci Cronin RN Registered Nurse    David Diehl RN Registered Nurse    Halima Samson, ST Surgical Tech    Adriana Chamberlain, [facility]    Shabbir Pineda, NURYS Nurse Anesthetist    aRkesh Carter RN Registered  "Nurse    Suzanna Kapadia, RN Registered Nurse    Alexa Dickey, RRT Respiratory Therapist    Hodgkins, Emma L., NNP Nurse Practitioner              Measurements    Weight: 1280 g  Weight (lbs): 2 lb 13.2 oz  Length: 39.5 cm  Length (in): 15.55"  Head circumference: 27.5 cm  Chest circumference: 22.5 cm  Abdominal girth: 22.5 cm         Apgars    Living status: Living  Apgar Component Scores:  1 min.:  5 min.:  10 min.:  15 min.:  20 min.:    Skin color:  0  1       Heart rate:  2  2       Reflex irritability:  1  2       Muscle tone:  1  1       Respiratory effort:  1  2       Total:  5  8       Apgars assigned by: E.HODGKINS,NNP         Operative Delivery    Forceps attempted?: No  Vacuum extractor attempted?: No         Shoulder Dystocia    Shoulder dystocia present?: No           Presentation    Presentation: Vertex  Position: Occiput Anterior           Interventions/Resuscitation    Method: Tactile Stimulation, Deep Suctioning, CPAP, PPV, NICU Attended       Cord    Vessels: 3 vessels  Complications: None  Delayed Cord Clamping?: No  Cord Clamped Date/Time: 2025  2:07 AM  Cord Blood Disposition: Lab  Gases Sent?: Yes       Placenta    Placenta delivery date/time: 2025 02:12  Placenta removal: Manual removal  Placenta appearance: Intact  Placenta disposition: Pathology           Labor Events:       labor: Yes     Labor Onset Date/Time:         Dilation Complete Date/Time:         Start Pushing Date/Time:         Start Pushing Date/Time:       Rupture Date/Time: 25 020        Rupture type: ARM (Artificial Rupture)        Fluid Amount:       Fluid Color: Clear              steroids: Full Course     Antibiotics given for GBS: No     Induction:       Indications for induction:        Augmentation:       Indications for augmentation:       Labor complications: Fetal Intolerance     Additional complications:          Cervical ripening:                     Delivery:      Episiotomy: " None     Indication for Episiotomy:       Perineal Lacerations:   Repaired:      Periurethral Laceration:   Repaired:     Labial Laceration:   Repaired:     Sulcus Laceration:   Repaired:     Vaginal Laceration:   Repaired:     Cervical Laceration:   Repaired:     Repair suture:       Repair # of packets:       Last Value - EBL - Nursing (mL):       Sum - EBL - Nursing (mL): 0     Last Value - EBL - Anesthesia (mL):      Calculated QBL (mL): 380     Running total QBL (mL): 380     Vaginal Sweep Performed:       Surgicount Correct: Yes     Vaginal Packing:   Quantity:       Other providers:       Anesthesia    Method: Spinal          Details (if applicable):  Trial of Labor No    Categorization: Primary    Priority: Urgent   Indications for : Fetal heart rate abnormalities   Incision Type: low transverse     Additional  information:  Forceps:    Vacuum:    Breech:    Observed anomalies    Other (Comments):

## 2025-06-16 NOTE — ANESTHESIA PREPROCEDURE EVALUATION
06/16/2025  Sandy Aguilar is a 19 y.o., female.      Pre-op Assessment    I have reviewed the Patient Summary Reports.     I have reviewed the Nursing Notes. I have reviewed the NPO Status.   I have reviewed the Medications.     Review of Systems  Anesthesia Hx:             Denies Family Hx of Anesthesia complications.    Denies Personal Hx of Anesthesia complications.                    Social:  Non-Smoker, No Alcohol Use       Hematology/Oncology:  Hematology Normal   Oncology Normal                                   EENT/Dental:  EENT/Dental Normal           Cardiovascular:     Hypertension                                    Hypertension         Pulmonary:  Pulmonary Normal                       Renal/:  Renal/ Normal                 Hepatic/GI:  Hepatic/GI Normal                    Musculoskeletal:  Musculoskeletal Normal                Neurological:  Neurology Normal                                      Endocrine:  Endocrine Normal            Dermatological:  Skin Normal    Psych:  Psychiatric Normal                    Physical Exam  General: Well nourished, Cooperative, Alert and Oriented    Airway:  Mallampati: I   Mouth Opening: Normal  TM Distance: Normal  Tongue: Normal  Neck ROM: Normal ROM    Dental:  Intact, Braces        Anesthesia Plan  Type of Anesthesia, risks & benefits discussed:    Anesthesia Type: Spinal  Intra-op Monitoring Plan: Standard ASA Monitors  Post Op Pain Control Plan: multimodal analgesia and intrathecal opioid  Informed Consent: Informed consent signed with the Patient and all parties understand the risks and agree with anesthesia plan.  All questions answered. Patient consented to blood products? Yes  ASA Score: 2 Emergent  Day of Surgery Review of History & Physical: H&P Update referred to the surgeon/provider.    Ready For Surgery From Anesthesia Perspective.      .

## 2025-06-16 NOTE — LACTATION NOTE
Lactation rounds- Reported to room to initiate pumping for NICU infant at this time. Introduced myself and stated I was informed that mother intends to give her infant breastmilk and asked if that was correct. Mother states no she does not want to breastfeed. Clarified if mother wants to pump and give breastmilk in a bottle and mother again states she does not. Asked mother if she wants to give infant formula in a bottle and mother states yes. Instructed mother to ask for lactation if she changes her mind but otherwise she will be removed from the lactation list. Mother verbalized understanding.     Updated mother's primary nurse, Suzanna, and NICU nurse Maryana on mother's change in feeding choice.

## 2025-06-16 NOTE — TRANSFER OF CARE
"Anesthesia Transfer of Care Note    Patient: Sandy Aguilar    Procedure(s) Performed: Procedure(s) (LRB):   SECTION (N/A)    Patient location: Labor and Delivery    Anesthesia Type: spinal    Transport from OR: Transported from OR on room air with adequate spontaneous ventilation    Post pain: adequate analgesia    Post assessment: no apparent anesthetic complications    Post vital signs: stable    Level of consciousness: awake, alert and oriented    Nausea/Vomiting: no nausea/vomiting    Complications: none    Transfer of care protocol was followed      Last vitals: Visit Vitals  BP (!) 143/88   Pulse 60   Temp 36.7 °C (98 °F) (Oral)   Resp 18   Ht 5' 2" (1.575 m)   Wt 91.2 kg (201 lb 1 oz)   LMP  (Approximate)   SpO2 100%   Breastfeeding No   BMI 36.77 kg/m²     "

## 2025-06-16 NOTE — ANESTHESIA PROCEDURE NOTES
Spinal    Diagnosis: IUP C section  Patient location during procedure: OB  Start time: 6/16/2025 1:52 AM  Timeout: 6/16/2025 1:52 AM  End time: 6/16/2025 1:53 AM    Staffing  Authorizing Provider: Efrain Almonte MD  Performing Provider: Shabbir Pineda CRNA    Staffing  Performed by: Shabbir Pineda CRNA  Authorized by: Efrain Almonte MD    Preanesthetic Checklist  Completed: patient identified, IV checked, risks and benefits discussed, surgical consent, monitors and equipment checked, pre-op evaluation and timeout performed  Spinal Block  Patient position: sitting  Prep: ChloraPrep  Patient monitoring: continuous pulse ox and frequent blood pressure checks  Approach: midline  Location: L3-4  Injection technique: single shot  CSF Fluid: clear free-flowing CSF  Needle  Needle type: pencil-tip   Needle gauge: 25 G  Needle length: 3.5 in  Additional Documentation: incremental injection, negative aspiration for heme and no paresthesia on injection  Needle localization: anatomical landmarks  Assessment  Sensory level: T5   Dermatomal levels determined by alcohol wipe  Ease of block: easy  Patient's tolerance of the procedure: comfortable throughout block and no complaints

## 2025-06-16 NOTE — PLAN OF CARE
Delivered via C/S. Infant in NICU. Pt in recovery, denies pain, bleeding light, and VSS. NAD. Plan of care reviewed.

## 2025-06-16 NOTE — LACTATION NOTE
This note was copied from a baby's chart.  Due to baby's admission to NICU, discussed feeding choice with mother. Reviewed the risks of formula feeding and the benefits of breastfeeding specifically due to baby's special needs at this time. Offered mother the opportunity to provide breastmilk for her baby. Mother states her intention is breastfeeding.  Mother's assigned RN will assist with breastmilk collection.  Obi Terrell RN 6/16/2025

## 2025-06-17 PROBLEM — O36.8390 NON-REASSURING FETAL HEART TONES COMPLICATING PREGNANCY, ANTEPARTUM: Status: RESOLVED | Noted: 2025-06-16 | Resolved: 2025-06-17

## 2025-06-17 PROBLEM — O36.5990 PREGNANCY AFFECTED BY FETAL GROWTH RESTRICTION: Status: RESOLVED | Noted: 2025-06-13 | Resolved: 2025-06-17

## 2025-06-17 LAB
ESTROGEN SERPL-MCNC: NORMAL PG/ML
INSULIN SERPL-ACNC: NORMAL U[IU]/ML
LAB AP DIAGNOSIS CATEGORY: NORMAL
LAB AP GROSS DESCRIPTION: NORMAL
LAB AP PERFORMING LOCATION(S): NORMAL
LAB AP REPORT FOOTNOTES: NORMAL

## 2025-06-17 PROCEDURE — 11000001 HC ACUTE MED/SURG PRIVATE ROOM

## 2025-06-17 PROCEDURE — 25000003 PHARM REV CODE 250: Performed by: STUDENT IN AN ORGANIZED HEALTH CARE EDUCATION/TRAINING PROGRAM

## 2025-06-17 PROCEDURE — 25000003 PHARM REV CODE 250: Performed by: OBSTETRICS & GYNECOLOGY

## 2025-06-17 PROCEDURE — 99024 POSTOP FOLLOW-UP VISIT: CPT | Mod: ,,, | Performed by: OBSTETRICS & GYNECOLOGY

## 2025-06-17 RX ORDER — HYDROCHLOROTHIAZIDE 12.5 MG/1
25 TABLET ORAL DAILY
Status: DISCONTINUED | OUTPATIENT
Start: 2025-06-17 | End: 2025-06-20 | Stop reason: HOSPADM

## 2025-06-17 RX ADMIN — HYDROCHLOROTHIAZIDE 25 MG: 12.5 TABLET ORAL at 11:06

## 2025-06-17 RX ADMIN — IBUPROFEN 800 MG: 800 TABLET, FILM COATED ORAL at 10:06

## 2025-06-17 RX ADMIN — DOCUSATE SODIUM 100 MG: 100 CAPSULE, LIQUID FILLED ORAL at 09:06

## 2025-06-17 RX ADMIN — SENNOSIDES AND DOCUSATE SODIUM 1 TABLET: 50; 8.6 TABLET ORAL at 08:06

## 2025-06-17 RX ADMIN — IBUPROFEN 800 MG: 800 TABLET, FILM COATED ORAL at 01:06

## 2025-06-17 RX ADMIN — PRENATAL VITAMINS-IRON FUMARATE 27 MG IRON-FOLIC ACID 0.8 MG TABLET 1 TABLET: at 09:06

## 2025-06-17 RX ADMIN — HYDROCODONE BITARTRATE AND ACETAMINOPHEN 1 TABLET: 5; 325 TABLET ORAL at 08:06

## 2025-06-17 RX ADMIN — IBUPROFEN 800 MG: 800 TABLET, FILM COATED ORAL at 02:06

## 2025-06-17 RX ADMIN — FERROUS SULFATE TAB 325 MG (65 MG ELEMENTAL FE) 1 EACH: 325 (65 FE) TAB at 09:06

## 2025-06-17 RX ADMIN — DOCUSATE SODIUM 100 MG: 100 CAPSULE, LIQUID FILLED ORAL at 08:06

## 2025-06-17 RX ADMIN — HYDROCODONE BITARTRATE AND ACETAMINOPHEN 1 TABLET: 7.5; 325 TABLET ORAL at 02:06

## 2025-06-17 NOTE — ASSESSMENT & PLAN NOTE
[facility] labs ordered.  Continue to monitor blood pressure closely.  06/17/2025  Pcr wnl 0.09; creatinine, platelets, lft wnl  Continue to follow closely  06/17/2025  Bp normal; continue to monitor closely  06/17/2025  Bp stable off meds

## 2025-06-17 NOTE — SUBJECTIVE & OBJECTIVE
"Interval History: pod#1    She is doing well this morning. She is tolerating a regular diet without nausea or vomiting. She is voiding spontaneously. She is ambulating. She has passed flatus, and has not a BM. Vaginal bleeding is mild. She denies fever or chills. Abdominal pain is mild and controlled with oral medications. She Is not breastfeeding.  Objective:     Vital Signs (Most Recent):  Temp: 98.3 °F (36.8 °C) (06/17/25 0830)  Pulse: 82 (06/17/25 0830)  Resp: 18 (06/17/25 0830)  BP: (!) 124/90 (06/17/25 0830)  SpO2: 100 % (06/16/25 0453) Vital Signs (24h Range):  Temp:  [97.5 °F (36.4 °C)-98.3 °F (36.8 °C)] 98.3 °F (36.8 °C)  Pulse:  [55-82] 82  Resp:  [18] 18  BP: ()/(55-90) 124/90     Weight: 91.2 kg (201 lb 1 oz)  Body mass index is 36.77 kg/m².      Intake/Output Summary (Last 24 hours) at 6/17/2025 1005  Last data filed at 6/16/2025 2300  Gross per 24 hour   Intake --   Output 400 ml   Net -400 ml         Significant Labs:  Lab Results   Component Value Date    GROUPTRH O POS 06/14/2025    HEPBSAG Non-reactive 12/17/2024     No results for input(s): "HGB", "HCT" in the last 48 hours.    I have personallly reviewed all pertinent lab results from the last 24 hours.  Recent Lab Results       None            Physical Exam:   Constitutional: She is oriented to person, place, and time. She appears well-developed.     Eyes: Pupils are equal, round, and reactive to light. Conjunctivae and EOM are normal.      Pulmonary/Chest: Effort normal.        Abdominal: Soft. She exhibits no abdominal incision (aquasel dressing intact). There is no abdominal tenderness.     Genitourinary:    Genitourinary Comments: Firm, non tender             Musculoskeletal: Normal range of motion. Edema (1+) present.       Neurological: She is alert and oriented to person, place, and time. She has normal reflexes.    Skin: Skin is warm.    Psychiatric: She has a normal mood and affect.       Review of Systems  "

## 2025-06-17 NOTE — ASSESSMENT & PLAN NOTE
06/17/2025  Pod #1  S/p primary c/section for non reassuring fht  Afebrile  Infant doing well in nicu  Continue dietary advances, encouraged ambulation, deep breathing exercises  Aware ok to shower  Anticipate discharge in 2-3 days

## 2025-06-17 NOTE — PLAN OF CARE
Problem: Adult Inpatient Plan of Care  Goal: Plan of Care Review  Outcome: Progressing  Goal: Patient-Specific Goal (Individualized)  Outcome: Progressing  Goal: Absence of Hospital-Acquired Illness or Injury  Outcome: Progressing  Goal: Optimal Comfort and Wellbeing  Outcome: Progressing  Goal: Readiness for Transition of Care  Outcome: Progressing     Problem: Infection  Goal: Absence of Infection Signs and Symptoms  Outcome: Progressing     Problem: Wound  Goal: Optimal Coping  Outcome: Progressing  Goal: Optimal Functional Ability  Outcome: Progressing  Goal: Absence of Infection Signs and Symptoms  Outcome: Progressing  Goal: Improved Oral Intake  Outcome: Progressing  Goal: Optimal Pain Control and Function  Outcome: Progressing  Goal: Skin Health and Integrity  Outcome: Progressing  Goal: Optimal Wound Healing  Outcome: Progressing     Problem: Postpartum ( Delivery)  Goal: Successful Parent Role Transition  Outcome: Progressing  Goal: Hemostasis  Outcome: Progressing  Goal: Effective Bowel Elimination  Outcome: Progressing  Goal: Fluid and Electrolyte Balance  Outcome: Progressing  Goal: Absence of Infection Signs and Symptoms  Outcome: Progressing  Goal: Optimal Pain Control and Function  Outcome: Progressing  Goal: Effective Oxygenation and Ventilation  Outcome: Progressing     Problem: Family Coping Readiness for Enhanced  Goal: Effective Family Coping  Outcome: Progressing     Problem: Diabetes in Pregnancy  Goal: Optimal Coping  Outcome: Met  Goal: Blood Glucose Level Within Targeted Range  Outcome: Met     Problem:  Fall Injury Risk  Goal: Absence of Fall, Infant Drop and Related Injury  Outcome: Met     Problem: Hospitalized  Patient  Goal: Optimal Patient-Fetal Wellbeing  Outcome: Met     Problem: Postpartum ( Delivery)  Goal: Anesthesia/Sedation Recovery  Outcome: Met  Goal: Nausea and Vomiting Relief  Outcome: Met  Goal: Effective Urinary Elimination  Outcome:  Met

## 2025-06-17 NOTE — PROGRESS NOTES
"[facility] ([facility])  Obstetrics  Postpartum Progress Note    Patient Name: Sandy Aguilar  MRN: [id number]  Admission Date: 2025  Hospital Length of Stay: 4 days  Attending Physician: Lima Duval,*  Primary Care Provider: Katlyn, Primary Doctor    Subjective:     Principal Problem:S/P  section    Hospital Course:  Patient admitted due to fetal growth restriction with AEDF on UA doppler.  2025--magnesium sulfate previously started for neuroprotection; bmz series, continous fetal monitoring  25-primary c/section for non reassuring fht, mother transferred to [facility] for routine postpartum care, infant to nicu      Interval History: pod#1    She is doing well this morning. She is tolerating a regular diet without nausea or vomiting. She is voiding spontaneously. She is ambulating. She has passed flatus, and has not a BM. Vaginal bleeding is mild. She denies fever or chills. Abdominal pain is mild and controlled with oral medications. She Is not breastfeeding.  Objective:     Vital Signs (Most Recent):  Temp: 98.3 °F (36.8 °C) (25 0830)  Pulse: 82 (25 0830)  Resp: 18 (25 0830)  BP: (!) 124/90 (25 0830)  SpO2: 100 % (25 0453) Vital Signs (24h Range):  Temp:  [97.5 °F (36.4 °C)-98.3 °F (36.8 °C)] 98.3 °F (36.8 °C)  Pulse:  [55-82] 82  Resp:  [18] 18  BP: ()/(55-90) 124/90     Weight: 91.2 kg (201 lb 1 oz)  Body mass index is 36.77 kg/m².      Intake/Output Summary (Last 24 hours) at 2025 1005  Last data filed at 2025 2300  Gross per 24 hour   Intake --   Output 400 ml   Net -400 ml         Significant Labs:  Lab Results   Component Value Date    GROUPTRH O POS 2025    HEPBSAG Non-reactive 2024     No results for input(s): "HGB", "HCT" in the last 48 hours.    I have personallly reviewed all pertinent lab results from the last 24 hours.  Recent Lab Results       None            Physical Exam:   Constitutional: She is oriented to " person, place, and time. She appears well-developed.     Eyes: Pupils are equal, round, and reactive to light. Conjunctivae and EOM are normal.      Pulmonary/Chest: Effort normal.        Abdominal: Soft. She exhibits no abdominal incision (aquasel dressing intact). There is no abdominal tenderness.     Genitourinary:    Genitourinary Comments: Firm, non tender             Musculoskeletal: Normal range of motion. Edema (1+) present.       Neurological: She is alert and oriented to person, place, and time. She has normal reflexes.    Skin: Skin is warm.    Psychiatric: She has a normal mood and affect.       Review of Systems  Assessment/Plan:     20 y.o. female  for:    * S/P  section  2025  Pod #1  S/p primary c/section for non reassuring fht  Afebrile  Infant doing well in nicu  Continue dietary advances, encouraged ambulation, deep breathing exercises  Aware ok to shower  Anticipate discharge in 2-3 days      Gestational hypertension, third trimester  PIH labs ordered.  Continue to monitor blood pressure closely.  2025  Pcr wnl 0.09; creatinine, platelets, lft wnl  Continue to follow closely  2025  Bp normal; continue to monitor closely  2025  Bp stable off meds        Disposition: As patient meets milestones, will plan to discharge  on pod #4.    Ami Felton MD  Obstetrics  [facility] ([facility])

## 2025-06-18 PROCEDURE — 11000001 HC ACUTE MED/SURG PRIVATE ROOM

## 2025-06-18 PROCEDURE — 25000003 PHARM REV CODE 250: Performed by: OBSTETRICS & GYNECOLOGY

## 2025-06-18 PROCEDURE — 25000003 PHARM REV CODE 250: Performed by: STUDENT IN AN ORGANIZED HEALTH CARE EDUCATION/TRAINING PROGRAM

## 2025-06-18 RX ORDER — ACETAMINOPHEN 325 MG/1
650 TABLET ORAL EVERY 4 HOURS PRN
Status: DISCONTINUED | OUTPATIENT
Start: 2025-06-18 | End: 2025-06-20 | Stop reason: HOSPADM

## 2025-06-18 RX ADMIN — HYDROCHLOROTHIAZIDE 25 MG: 12.5 TABLET ORAL at 09:06

## 2025-06-18 RX ADMIN — SENNOSIDES AND DOCUSATE SODIUM 1 TABLET: 50; 8.6 TABLET ORAL at 09:06

## 2025-06-18 RX ADMIN — IBUPROFEN 800 MG: 800 TABLET, FILM COATED ORAL at 01:06

## 2025-06-18 RX ADMIN — PRENATAL VITAMINS-IRON FUMARATE 27 MG IRON-FOLIC ACID 0.8 MG TABLET 1 TABLET: at 09:06

## 2025-06-18 RX ADMIN — DOCUSATE SODIUM 100 MG: 100 CAPSULE, LIQUID FILLED ORAL at 09:06

## 2025-06-18 RX ADMIN — FERROUS SULFATE TAB 325 MG (65 MG ELEMENTAL FE) 1 EACH: 325 (65 FE) TAB at 09:06

## 2025-06-18 RX ADMIN — IBUPROFEN 800 MG: 800 TABLET, FILM COATED ORAL at 05:06

## 2025-06-18 RX ADMIN — IBUPROFEN 800 MG: 800 TABLET, FILM COATED ORAL at 09:06

## 2025-06-18 RX ADMIN — ACETAMINOPHEN 650 MG: 325 TABLET ORAL at 02:06

## 2025-06-18 NOTE — PLAN OF CARE
Patient afebrile and had no falls this shift. Fundus firm without massage and below umbilicus. Bleeding light, no clots passed this shift. Voids spontaneously. Ambulates independently. Pain well controlled with oral pain medication. Vital signs stable at this time. Infant in NICU. Call light placed within patients reach, encouraged use of needed.

## 2025-06-18 NOTE — ASSESSMENT & PLAN NOTE
Underwent  on 25.  - POD 2  - Tolerating PO  - Pain managed on current regimen  - Ambulating  - Voiding spontaneously  - Doing well, wishes to remain inpatient due to infant in NICU

## 2025-06-18 NOTE — PLAN OF CARE
Problem: Adult Inpatient Plan of Care  Goal: Plan of Care Review  Outcome: Progressing  Goal: Patient-Specific Goal (Individualized)  Outcome: Progressing  Goal: Absence of Hospital-Acquired Illness or Injury  Outcome: Progressing  Goal: Optimal Comfort and Wellbeing  Outcome: Progressing  Goal: Readiness for Transition of Care  Outcome: Progressing     Problem: Infection  Goal: Absence of Infection Signs and Symptoms  Outcome: Progressing     Problem: Wound  Goal: Optimal Coping  Outcome: Progressing  Goal: Optimal Functional Ability  Outcome: Progressing  Goal: Absence of Infection Signs and Symptoms  Outcome: Progressing  Goal: Improved Oral Intake  Outcome: Progressing  Goal: Optimal Pain Control and Function  Outcome: Progressing  Goal: Skin Health and Integrity  Outcome: Progressing  Goal: Optimal Wound Healing  Outcome: Progressing     Problem: Postpartum ( Delivery)  Goal: Successful Parent Role Transition  Outcome: Progressing  Goal: Hemostasis  Outcome: Progressing  Goal: Effective Bowel Elimination  Outcome: Progressing  Goal: Fluid and Electrolyte Balance  Outcome: Progressing  Goal: Absence of Infection Signs and Symptoms  Outcome: Progressing  Goal: Optimal Pain Control and Function  Outcome: Progressing  Goal: Effective Oxygenation and Ventilation  Outcome: Progressing     Problem: Family Coping Readiness for Enhanced  Goal: Effective Family Coping  Outcome: Progressing

## 2025-06-18 NOTE — SUBJECTIVE & OBJECTIVE
"Interval History: POD 2 s/p .    She is doing well this morning. She is tolerating a regular diet without nausea or vomiting. She is voiding spontaneously. She is ambulating. She has passed flatus, and has not a BM. Vaginal bleeding is mild. She denies fever or chills. Abdominal pain is mild and controlled with oral medications. She Is not breastfeeding. Infant in NICU.     Objective:     Vital Signs (Most Recent):  Temp: 98.1 °F (36.7 °C) (25 0735)  Pulse: 97 (25 0735)  Resp: 15 (25 07)  BP: (!) 107/53 (25 0735)  SpO2: 99 % (25 07) Vital Signs (24h Range):  Temp:  [97.9 °F (36.6 °C)-98.3 °F (36.8 °C)] 98.1 °F (36.7 °C)  Pulse:  [76-97] 97  Resp:  [15-20] 15  SpO2:  [98 %-100 %] 99 %  BP: (107-128)/(53-90) 107/53     Weight: 91.2 kg (201 lb 1 oz)  Body mass index is 36.77 kg/m².    No intake or output data in the 24 hours ending 25 0817      Significant Labs:  Lab Results   Component Value Date    GROUPTR O POS 2025    HEPBSAG Non-reactive 2024     No results for input(s): "HGB", "HCT" in the last 48 hours.    I have personallly reviewed all pertinent lab results from the last 24 hours.  Recent Lab Results       None            Physical Exam:   Constitutional: She is oriented to person, place, and time. She appears well-developed. No distress.    HENT:   Head: Normocephalic and atraumatic.    Eyes: Conjunctivae are normal. Right eye exhibits no discharge. Left eye exhibits no discharge. No scleral icterus.      Pulmonary/Chest: Effort normal. No respiratory distress.        Abdominal: Soft. She exhibits abdominal incision (Pfannenstiel incision with acquacel dressing in place, CDI. Appropriately TTP.). She exhibits no distension.   Fundus firm and below the level of the umbilicus             Musculoskeletal: Normal range of motion. No edema.       Neurological: She is alert and oriented to person, place, and time.    Skin: Skin is warm and dry. She is not " diaphoretic.

## 2025-06-18 NOTE — PROGRESS NOTES
"[facility] ([facility])  Obstetrics  Postpartum Progress Note    Patient Name: Sandy Aguilar  MRN: [id number]  Admission Date: 2025  Hospital Length of Stay: 5 days  Attending Physician: Lima Duval,*  Primary Care Provider: Katlyn, Primary Doctor    Subjective:     Principal Problem:Status post primary low transverse  section    Hospital Course:  Patient admitted due to fetal growth restriction with AEDF on UA doppler.  2025--magnesium sulfate previously started for neuroprotection; bmz series, continous fetal monitoring  25-primary c/section for non reassuring fht, mother transferred to [facility] for routine postpartum care, infant to nicu      Interval History: POD 2 s/p .    She is doing well this morning. She is tolerating a regular diet without nausea or vomiting. She is voiding spontaneously. She is ambulating. She has passed flatus, and has not a BM. Vaginal bleeding is mild. She denies fever or chills. Abdominal pain is mild and controlled with oral medications. She Is not breastfeeding. Infant in NICU.     Objective:     Vital Signs (Most Recent):  Temp: 98.1 °F (36.7 °C) (25 07)  Pulse: 97 (25 07)  Resp: 15 (25)  BP: (!) 107/53 (25)  SpO2: 99 % (25) Vital Signs (24h Range):  Temp:  [97.9 °F (36.6 °C)-98.3 °F (36.8 °C)] 98.1 °F (36.7 °C)  Pulse:  [76-97] 97  Resp:  [15-20] 15  SpO2:  [98 %-100 %] 99 %  BP: (107-128)/(53-90) 107/53     Weight: 91.2 kg (201 lb 1 oz)  Body mass index is 36.77 kg/m².    No intake or output data in the 24 hours ending 25 0817      Significant Labs:  Lab Results   Component Value Date    GROUPTRH O POS 2025    HEPBSAG Non-reactive 2024     No results for input(s): "HGB", "HCT" in the last 48 hours.    I have personallly reviewed all pertinent lab results from the last 24 hours.  Recent Lab Results       None            Physical Exam:   Constitutional: She is oriented to " person, place, and time. She appears well-developed. No distress.    HENT:   Head: Normocephalic and atraumatic.    Eyes: Conjunctivae are normal. Right eye exhibits no discharge. Left eye exhibits no discharge. No scleral icterus.      Pulmonary/Chest: Effort normal. No respiratory distress.        Abdominal: Soft. She exhibits abdominal incision (Pfannenstiel incision with acquacel dressing in place, CDI. Appropriately TTP.). She exhibits no distension.   Fundus firm and below the level of the umbilicus             Musculoskeletal: Normal range of motion. No edema.       Neurological: She is alert and oriented to person, place, and time.    Skin: Skin is warm and dry. She is not diaphoretic.          Assessment/Plan:     20 y.o. female  for:    * Status post primary low transverse  section  Underwent  on 25.  - POD 2  - Tolerating PO  - Pain managed on current regimen  - Ambulating  - Voiding spontaneously  - Doing well, wishes to remain inpatient due to infant in NICU        Gestational hypertension, third trimester  - BP stable without medications, continue to monitor        Disposition: As patient meets milestones, will plan to discharge accordingly.    MARY RamosC  Obstetrics  [facility] ([facility])

## 2025-06-18 NOTE — ASSESSMENT & PLAN NOTE
U/S today shows EFW at second percentile with AEDF.  Betamethasone series ordered  Continuous monitoring for now.  Will repeat UA doppler on Monday, 6/16 06/18/2025  Continous fetal monitoring  Repeat dopplers on Monday 6/16/25  Patient aware if NRFT , will need primary c/section  06/18/2025  Continous monitoring  S/p bmz x2; s/p magnesium sulfate  Repeat u/a dopplers on Monday and consult with MFM regarding delivery timing

## 2025-06-19 PROCEDURE — 25000003 PHARM REV CODE 250: Performed by: STUDENT IN AN ORGANIZED HEALTH CARE EDUCATION/TRAINING PROGRAM

## 2025-06-19 PROCEDURE — 11000001 HC ACUTE MED/SURG PRIVATE ROOM

## 2025-06-19 PROCEDURE — 25000003 PHARM REV CODE 250: Performed by: OBSTETRICS & GYNECOLOGY

## 2025-06-19 RX ADMIN — SENNOSIDES AND DOCUSATE SODIUM 1 TABLET: 50; 8.6 TABLET ORAL at 09:06

## 2025-06-19 RX ADMIN — DOCUSATE SODIUM 100 MG: 100 CAPSULE, LIQUID FILLED ORAL at 09:06

## 2025-06-19 RX ADMIN — HYDROCODONE BITARTRATE AND ACETAMINOPHEN 1 TABLET: 5; 325 TABLET ORAL at 03:06

## 2025-06-19 RX ADMIN — PRENATAL VITAMINS-IRON FUMARATE 27 MG IRON-FOLIC ACID 0.8 MG TABLET 1 TABLET: at 08:06

## 2025-06-19 RX ADMIN — DOCUSATE SODIUM 100 MG: 100 CAPSULE, LIQUID FILLED ORAL at 08:06

## 2025-06-19 RX ADMIN — IBUPROFEN 800 MG: 800 TABLET, FILM COATED ORAL at 09:06

## 2025-06-19 RX ADMIN — HYDROCODONE BITARTRATE AND ACETAMINOPHEN 1 TABLET: 5; 325 TABLET ORAL at 10:06

## 2025-06-19 RX ADMIN — HYDROCHLOROTHIAZIDE 25 MG: 12.5 TABLET ORAL at 08:06

## 2025-06-19 RX ADMIN — FERROUS SULFATE TAB 325 MG (65 MG ELEMENTAL FE) 1 EACH: 325 (65 FE) TAB at 08:06

## 2025-06-19 RX ADMIN — IBUPROFEN 800 MG: 800 TABLET, FILM COATED ORAL at 04:06

## 2025-06-19 RX ADMIN — IBUPROFEN 800 MG: 800 TABLET, FILM COATED ORAL at 01:06

## 2025-06-19 NOTE — ASSESSMENT & PLAN NOTE
U/S today shows EFW at second percentile with AEDF.  Betamethasone series ordered  Continuous monitoring for now.  Will repeat UA doppler on Monday, 6/16 06/19/2025  Continous fetal monitoring  Repeat dopplers on Monday 6/16/25  Patient aware if NRFT , will need primary c/section  06/19/2025  Continous monitoring  S/p bmz x2; s/p magnesium sulfate  Repeat u/a dopplers on Monday and consult with MFM regarding delivery timing

## 2025-06-19 NOTE — ASSESSMENT & PLAN NOTE
Underwent  on 25.  - POD 3  - Tolerating PO  - Pain managed on current regimen  - Ambulating  - Voiding spontaneously  - Doing well, wishes to remain inpatient due to infant in NICU

## 2025-06-19 NOTE — PLAN OF CARE
Patient afebrile and had no falls this shift. Fundus firm without massage and below umbilicus. Bleeding light, no clots passed this shift. Voids spontaneously. Ambulates independently. Pain well controlled with oral pain medication. Vital signs stable at this time.Visits infant in NICU.       Problem: Adult Inpatient Plan of Care  Goal: Plan of Care Review  Outcome: Progressing  Goal: Patient-Specific Goal (Individualized)  Outcome: Progressing  Goal: Absence of Hospital-Acquired Illness or Injury  Outcome: Progressing  Goal: Optimal Comfort and Wellbeing  Outcome: Progressing  Goal: Readiness for Transition of Care  Outcome: Progressing     Problem: Infection  Goal: Absence of Infection Signs and Symptoms  Outcome: Progressing     Problem: Wound  Goal: Optimal Coping  Outcome: Progressing  Goal: Optimal Functional Ability  Outcome: Progressing  Goal: Absence of Infection Signs and Symptoms  Outcome: Progressing  Goal: Improved Oral Intake  Outcome: Progressing  Goal: Optimal Pain Control and Function  Outcome: Progressing  Goal: Skin Health and Integrity  Outcome: Progressing  Goal: Optimal Wound Healing  Outcome: Progressing     Problem: Postpartum ( Delivery)  Goal: Successful Parent Role Transition  Outcome: Progressing  Goal: Hemostasis  Outcome: Progressing  Goal: Effective Bowel Elimination  Outcome: Progressing  Goal: Fluid and Electrolyte Balance  Outcome: Progressing  Goal: Absence of Infection Signs and Symptoms  Outcome: Progressing  Goal: Optimal Pain Control and Function  Outcome: Progressing  Goal: Effective Oxygenation and Ventilation  Outcome: Progressing     Problem: Family Coping Readiness for Enhanced  Goal: Effective Family Coping  Outcome: Progressing

## 2025-06-19 NOTE — PROGRESS NOTES
"[facility] ([facility])  Obstetrics  Postpartum Progress Note    Patient Name: Sandy Aguilar  MRN: [id number]  Admission Date: 2025  Hospital Length of Stay: 6 days  Attending Physician: Lima Duval,*  Primary Care Provider: Katlyn, Primary Doctor    Subjective:     Principal Problem:Status post primary low transverse  section    Hospital Course:  Patient admitted due to fetal growth restriction with AEDF on UA doppler.  2025--magnesium sulfate previously started for neuroprotection; bmz series, continous fetal monitoring  25-primary c/section for non reassuring fht, mother transferred to [facility] for routine postpartum care, infant to nicu      Interval History: POD 3 s/p .    She is doing well this morning. She is tolerating a regular diet without nausea or vomiting. She is voiding spontaneously. She is ambulating. She has passed flatus, and has not a BM. Vaginal bleeding is mild. She denies fever or chills. Abdominal pain is mild and controlled with oral medications. She Is not breastfeeding.     Objective:     Vital Signs (Most Recent):  Temp: 97.5 °F (36.4 °C) (25 075)  Pulse: 95 (25)  Resp: 18 (25)  BP: (!) 112/58 (25)  SpO2: 100 % (25) Vital Signs (24h Range):  Temp:  [97.5 °F (36.4 °C)-98.4 °F (36.9 °C)] 97.5 °F (36.4 °C)  Pulse:  [] 95  Resp:  [16-18] 18  SpO2:  [97 %-100 %] 100 %  BP: (112-129)/(58-87) 112/58     Weight: 91.2 kg (201 lb 1 oz)  Body mass index is 36.77 kg/m².    No intake or output data in the 24 hours ending 25 0830      Significant Labs:  Lab Results   Component Value Date    GROUPTRH O POS 2025    HEPBSAG Non-reactive 2024     No results for input(s): "HGB", "HCT" in the last 48 hours.    I have personallly reviewed all pertinent lab results from the last 24 hours.  Recent Lab Results       None            Physical Exam:   Constitutional: She is oriented to person, " place, and time. She appears well-developed. No distress.    HENT:   Head: Normocephalic and atraumatic.    Eyes: Conjunctivae are normal. Right eye exhibits no discharge. Left eye exhibits no discharge. No scleral icterus.      Pulmonary/Chest: Effort normal. No respiratory distress.        Abdominal: Soft. She exhibits abdominal incision (Pfannenstiel incision with acquacel dressing in place, CDI. Appropriately TTP.). She exhibits no distension.   Fundus firm and below the level of the umbilicus             Musculoskeletal: Normal range of motion. No edema.       Neurological: She is alert and oriented to person, place, and time.    Skin: Skin is warm and dry. She is not diaphoretic.          Assessment/Plan:     20 y.o. female  for:    * Status post primary low transverse  section  Underwent  on 25.  - POD 3  - Tolerating PO  - Pain managed on current regimen  - Ambulating  - Voiding spontaneously  - Doing well, wishes to remain inpatient due to infant in NICU        Gestational hypertension, third trimester  - BP stable without medications, continue to monitor        Disposition: As patient meets milestones, will plan to discharge tomorrow.    MARY RamosC  Obstetrics  [facility] ([facility])

## 2025-06-19 NOTE — SUBJECTIVE & OBJECTIVE
"Interval History: POD 3 s/p .    She is doing well this morning. She is tolerating a regular diet without nausea or vomiting. She is voiding spontaneously. She is ambulating. She has passed flatus, and has not a BM. Vaginal bleeding is mild. She denies fever or chills. Abdominal pain is mild and controlled with oral medications. She Is not breastfeeding.     Objective:     Vital Signs (Most Recent):  Temp: 97.5 °F (36.4 °C) (25 075)  Pulse: 95 (25)  Resp: 18 (25)  BP: (!) 112/58 (25)  SpO2: 100 % (25) Vital Signs (24h Range):  Temp:  [97.5 °F (36.4 °C)-98.4 °F (36.9 °C)] 97.5 °F (36.4 °C)  Pulse:  [] 95  Resp:  [16-18] 18  SpO2:  [97 %-100 %] 100 %  BP: (112-129)/(58-87) 112/58     Weight: 91.2 kg (201 lb 1 oz)  Body mass index is 36.77 kg/m².    No intake or output data in the 24 hours ending 25 0830      Significant Labs:  Lab Results   Component Value Date    GROUPTRH O POS 2025    HEPBSAG Non-reactive 2024     No results for input(s): "HGB", "HCT" in the last 48 hours.    I have personallly reviewed all pertinent lab results from the last 24 hours.  Recent Lab Results       None            Physical Exam:   Constitutional: She is oriented to person, place, and time. She appears well-developed. No distress.    HENT:   Head: Normocephalic and atraumatic.    Eyes: Conjunctivae are normal. Right eye exhibits no discharge. Left eye exhibits no discharge. No scleral icterus.      Pulmonary/Chest: Effort normal. No respiratory distress.        Abdominal: Soft. She exhibits abdominal incision (Pfannenstiel incision with acquacel dressing in place, CDI. Appropriately TTP.). She exhibits no distension.   Fundus firm and below the level of the umbilicus             Musculoskeletal: Normal range of motion. No edema.       Neurological: She is alert and oriented to person, place, and time.    Skin: Skin is warm and dry. She is not diaphoretic. "

## 2025-06-20 VITALS
RESPIRATION RATE: 16 BRPM | WEIGHT: 201.06 LBS | OXYGEN SATURATION: 99 % | TEMPERATURE: 98 F | HEIGHT: 62 IN | SYSTOLIC BLOOD PRESSURE: 130 MMHG | DIASTOLIC BLOOD PRESSURE: 78 MMHG | BODY MASS INDEX: 37 KG/M2 | HEART RATE: 92 BPM

## 2025-06-20 PROBLEM — O13.3 GESTATIONAL HYPERTENSION, THIRD TRIMESTER: Status: RESOLVED | Noted: 2025-06-13 | Resolved: 2025-06-20

## 2025-06-20 PROCEDURE — 25000003 PHARM REV CODE 250: Performed by: STUDENT IN AN ORGANIZED HEALTH CARE EDUCATION/TRAINING PROGRAM

## 2025-06-20 PROCEDURE — 99238 HOSP IP/OBS DSCHRG MGMT 30/<: CPT | Mod: ,,, | Performed by: OBSTETRICS & GYNECOLOGY

## 2025-06-20 PROCEDURE — 25000003 PHARM REV CODE 250: Performed by: OBSTETRICS & GYNECOLOGY

## 2025-06-20 RX ORDER — HYDROCHLOROTHIAZIDE 25 MG/1
25 TABLET ORAL DAILY
Qty: 5 TABLET | Refills: 0 | Status: SHIPPED | OUTPATIENT
Start: 2025-06-21 | End: 2025-06-26

## 2025-06-20 RX ORDER — IBUPROFEN 800 MG/1
800 TABLET, FILM COATED ORAL EVERY 8 HOURS
Qty: 30 TABLET | Refills: 0 | Status: SHIPPED | OUTPATIENT
Start: 2025-06-20

## 2025-06-20 RX ORDER — HYDROCODONE BITARTRATE AND ACETAMINOPHEN 5; 325 MG/1; MG/1
1 TABLET ORAL EVERY 6 HOURS PRN
Qty: 15 TABLET | Refills: 0 | Status: SHIPPED | OUTPATIENT
Start: 2025-06-20

## 2025-06-20 RX ORDER — FERROUS SULFATE 325(65) MG
325 TABLET, DELAYED RELEASE (ENTERIC COATED) ORAL DAILY
Qty: 30 TABLET | Refills: 3 | Status: SHIPPED | OUTPATIENT
Start: 2025-06-20

## 2025-06-20 RX ORDER — AMOXICILLIN 250 MG
1 CAPSULE ORAL NIGHTLY
Qty: 30 TABLET | Refills: 0 | Status: SHIPPED | OUTPATIENT
Start: 2025-06-20

## 2025-06-20 RX ADMIN — HYDROCHLOROTHIAZIDE 25 MG: 12.5 TABLET ORAL at 08:06

## 2025-06-20 RX ADMIN — HYDROCODONE BITARTRATE AND ACETAMINOPHEN 1 TABLET: 5; 325 TABLET ORAL at 11:06

## 2025-06-20 RX ADMIN — DOCUSATE SODIUM 100 MG: 100 CAPSULE, LIQUID FILLED ORAL at 08:06

## 2025-06-20 RX ADMIN — PRENATAL VITAMINS-IRON FUMARATE 27 MG IRON-FOLIC ACID 0.8 MG TABLET 1 TABLET: at 08:06

## 2025-06-20 RX ADMIN — IBUPROFEN 800 MG: 800 TABLET, FILM COATED ORAL at 05:06

## 2025-06-20 RX ADMIN — FERROUS SULFATE TAB 325 MG (65 MG ELEMENTAL FE) 1 EACH: 325 (65 FE) TAB at 08:06

## 2025-06-20 NOTE — SUBJECTIVE & OBJECTIVE
"Interval History: POD 4 s/p .    She is doing well this morning. She is tolerating a regular diet without nausea or vomiting. She is voiding spontaneously. She is ambulating. She has passed flatus, and has a BM. Vaginal bleeding is mild. She denies fever or chills. Abdominal pain is mild and controlled with oral medications. She Is not breastfeeding.     Objective:     Vital Signs (Most Recent):  Temp: 98.3 °F (36.8 °C) (25 07)  Pulse: 92 (25)  Resp: 18 (25 0434)  BP: 130/78 (25)  SpO2: 99 % (25) Vital Signs (24h Range):  Temp:  [98.1 °F (36.7 °C)-98.4 °F (36.9 °C)] 98.3 °F (36.8 °C)  Pulse:  [] 92  Resp:  [18] 18  SpO2:  [99 %-100 %] 99 %  BP: (101-146)/(63-90) 130/78     Weight: 91.2 kg (201 lb 1 oz)  Body mass index is 36.77 kg/m².    No intake or output data in the 24 hours ending 25 0907      Significant Labs:  Lab Results   Component Value Date    GROUPTRH O POS 2025    HEPBSAG Non-reactive 2024     No results for input(s): "HGB", "HCT" in the last 48 hours.    I have personallly reviewed all pertinent lab results from the last 24 hours.  Recent Lab Results       None            Physical Exam:   Constitutional: She is oriented to person, place, and time. She appears well-developed. No distress.    HENT:   Head: Normocephalic and atraumatic.    Eyes: Conjunctivae are normal. Right eye exhibits no discharge. Left eye exhibits no discharge. No scleral icterus.      Pulmonary/Chest: Effort normal. No respiratory distress.        Abdominal: Soft. She exhibits abdominal incision (Pfannenstiel incision with acquacel dressing in place, CDI. Appropriately TTP.). She exhibits no distension.   Fundus firm and below the level of the umbilicus             Musculoskeletal: Normal range of motion. No edema.       Neurological: She is alert and oriented to person, place, and time.    Skin: Skin is warm and dry. She is not diaphoretic.          "

## 2025-06-20 NOTE — ASSESSMENT & PLAN NOTE
U/S today shows EFW at second percentile with AEDF.  Betamethasone series ordered  Continuous monitoring for now.  Will repeat UA doppler on Monday, 6/16 06/20/2025  Continous fetal monitoring  Repeat dopplers on Monday 6/16/25  Patient aware if NRFT , will need primary c/section  06/20/2025  Continous monitoring  S/p bmz x2; s/p magnesium sulfate  Repeat u/a dopplers on Monday and consult with MFM regarding delivery timing

## 2025-06-20 NOTE — DISCHARGE SUMMARY
"[facility] ([facility])  Obstetrics  Discharge Summary      Patient Name: Sandy Aguilar  MRN: [id number]  Admission Date: 2025  Hospital Length of Stay: 7 days  Discharge Date and Time: 2025 9:12 AM  Attending Physician: Lima Duval,*   Discharging Provider: Gabriela Romo PA-C   Primary Care Provider: Katlyn, Primary Doctor    HPI: 19 y.o. female  at 32w5d EGA, sent from office by [facility] due to fetal growth restriction with absent end diastolic flow on u/s today.  Patient reports normal fetal movement.  No contractions, fluid leakage, or vaginal bleeding.  No PIH symptoms.  No other complaints.        Procedure(s) (LRB):   SECTION (N/A)     Hospital Course:   Patient admitted due to fetal growth restriction with AEDF on UA doppler.  2025--magnesium sulfate previously started for neuroprotection; bmz series, continous fetal monitoring  25-primary c/section for non reassuring fht, mother transferred to [facility] for routine postpartum care, infant to nicu           Final Active Diagnoses:    Diagnosis Date Noted POA    PRINCIPAL PROBLEM:  Status post primary low transverse  section [Z98.891] 2025 Not Applicable      Problems Resolved During this Admission:    Diagnosis Date Noted Date Resolved POA    Non-reassuring fetal heart tones complicating pregnancy, antepartum [O36.8390] 2025 No    Pregnancy affected by fetal growth restriction [O36.5990] 2025 Yes    Gestational hypertension, third trimester [O13.3] 2025 Yes        Significant Diagnostic Studies: Labs: BMP: No results for input(s): "GLU", "NA", "K", "CL", "CO2", "BUN", "CREATININE", "CALCIUM", "MG" in the last 48 hours., CMP No results for input(s): "NA", "K", "CL", "CO2", "GLU", "BUN", "CREATININE", "CALCIUM", "PROT", "ALBUMIN", "BILITOT", "ALKPHOS", "AST", "ALT", "ANIONGAP", "ESTGFRAFRICA", "EGFRNONAA" in the last 48 hours., CBC No results for " "input(s): "WBC", "HGB", "HCT", "PLT" in the last 48 hours., and All labs within the past 24 hours have been reviewed      Feeding Method: bottle    Immunizations       Date Immunization Status Dose Route/Site Given by    21 0000 COVID-19, MRNA, LN-S, PF (Pfizer) (Purple Cap) Given 0.3 mL Intramuscular/Left arm     21 0000 COVID-19, MRNA, LN-S, PF (Pfizer) (Purple Cap) Given 0.3 mL Intramuscular/Left arm             Delivery:    Episiotomy: None   Lacerations:     Repair suture:     Repair # of packets:     Blood loss (ml):       Birth information:  YOB: 2025   Time of birth: 2:07 AM   Sex: male   Delivery type: , Low Transverse   Gestational Age: 33w1d     Measurements    Weight: 1280 g  Weight (lbs): 2 lb 13.2 oz  Length: 39.5 cm  Length (in): 15.55"  Head circumference: 27.5 cm  Chest circumference: 22.5 cm  Abdominal girth: 22.5 cm         Delivery Clinician: Delivery Providers    Delivering clinician: Ami Felton MD   Provider Role    Staci Cronin RN Registered Nurse    David Diehl RN Registered Nurse    Halima Samson, ST Surgical Tech    Leganalia, Adriana ROONEY, [facility]    Shabbir Pineda, CRNA Nurse Anesthetist    Rakesh Carter RN Registered Nurse    Suzanna Kapadia RN Registered Nurse    Alexa Dickey, RRT Respiratory Therapist    Hodgkins, Emma L., NNP Nurse Practitioner             Additional  information:  Forceps:    Vacuum:    Breech:    Observed anomalies      Living?:     Apgars    Living status: Living  Apgar Component Scores:  1 min.:  5 min.:  10 min.:  15 min.:  20 min.:    Skin color:  0  1       Heart rate:  2  2       Reflex irritability:  1  2       Muscle tone:  1  1       Respiratory effort:  1  2       Total:  5  8       Apgars assigned by: E.HODGKINS,NNP         Placenta: Delivered:       appearance  Pending Diagnostic Studies:       None            Discharged Condition: good    Disposition: Home or Self Care    Follow Up:   " Follow-up Information       Gabriela Romo PA-C Follow up in 1 week(s).    Specialty: Obstetrics and Gynecology  Why: dressing removal  Contact information:  [address]  [place] [address] [phone]               Ami Felton MD Follow up in 4 week(s).    Specialty: Obstetrics and Gynecology  Why: post op check (blossom, españa, grove)  Contact information:  [address]  [place] [address] [phone]                           Patient Instructions:      Diet Adult Regular     Lifting restrictions   Order Comments: No lifting anything over 15lbs for the next 6 weeks.     No driving until:   Order Comments: No driving while taking pain medication.     Pelvic Rest   Order Comments: No tampon use, douching, or intercourse for 6 weeks.     Leave dressing on - Keep it clean, dry, and intact until clinic visit   Order Comments: Dressing will be removed at 1 week nurse visit.  Showers only- no baths for 6 weeks.     Notify your health care provider if you experience any of the following:  temperature >100.4     Notify your health care provider if you experience any of the following:  severe uncontrolled pain     Notify your health care provider if you experience any of the following:  redness, tenderness, or signs of infection (pain, swelling, redness, odor or green/yellow discharge around incision site)     Notify your health care provider if you experience any of the following:  difficulty breathing or increased cough     Notify your health care provider if you experience any of the following:  severe persistent headache     Notify your health care provider if you experience any of the following:  worsening rash     Notify your health care provider if you experience any of the following:  persistent dizziness, light-headedness, or visual disturbances     Notify your health care provider if you experience any of the following:  increased confusion or weakness     Medications:  Current Discharge  Medication List        START taking these medications    Details   ferrous sulfate 325 (65 FE) MG EC tablet Take 1 tablet (325 mg total) by mouth once daily.  Qty: 30 tablet, Refills: 3      hydroCHLOROthiazide (HYDRODIURIL) 25 MG tablet Take 1 tablet (25 mg total) by mouth once daily. for 5 days  Qty: 5 tablet, Refills: 0    Comments: .      HYDROcodone-acetaminophen (NORCO) 5-325 mg per tablet Take 1 tablet by mouth every 6 (six) hours as needed for Pain.  Qty: 15 tablet, Refills: 0    Comments: Quantity prescribed more than 7 day supply? No  Associated Diagnoses: S/P  section      ibuprofen (ADVIL,MOTRIN) 800 MG tablet Take 1 tablet (800 mg total) by mouth every 8 (eight) hours.  Qty: 30 tablet, Refills: 0      senna-docusate (PERICOLACE) 8.6-50 mg per tablet Take 1 tablet by mouth every evening.  Qty: 30 tablet, Refills: 0           CONTINUE these medications which have NOT CHANGED    Details   ondansetron (ZOFRAN-ODT) 4 MG TbDL Take 1 tablet (4 mg total) by mouth every 8 (eight) hours as needed (nausea).  Qty: 30 tablet, Refills: 2    Associated Diagnoses: Nausea and vomiting during pregnancy      pantoprazole (PROTONIX) 20 MG tablet Take 1 tablet (20 mg total) by mouth once daily.  Qty: 30 tablet, Refills: 1    Associated Diagnoses: Heartburn during pregnancy in first trimester      prenatal vit,carl 74/iron/folic (PRENATAL VITAMIN 1+1 ORAL) Take by mouth.      promethazine (PHENERGAN) 25 MG tablet Take 1 tablet (25 mg total) by mouth nightly as needed for Nausea.  Qty: 30 tablet, Refills: 1    Associated Diagnoses: Nausea/vomiting in pregnancy             Gabriela Romo, ESTEFANI  Obstetrics  [facility] ([facility])

## 2025-06-20 NOTE — PROGRESS NOTES
"[facility] ([facility])  Obstetrics  Postpartum Progress Note    Patient Name: Sandy Aguilar  MRN: [id number]  Admission Date: 2025  Hospital Length of Stay: 7 days  Attending Physician: Lima Duval,*  Primary Care Provider: Katlyn, Primary Doctor    Subjective:     Principal Problem:Status post primary low transverse  section    Hospital Course:  Patient admitted due to fetal growth restriction with AEDF on UA doppler.  2025--magnesium sulfate previously started for neuroprotection; bmz series, continous fetal monitoring  25-primary c/section for non reassuring fht, mother transferred to [facility] for routine postpartum care, infant to nicu      Interval History: POD 4 s/p .    She is doing well this morning. She is tolerating a regular diet without nausea or vomiting. She is voiding spontaneously. She is ambulating. She has passed flatus, and has a BM. Vaginal bleeding is mild. She denies fever or chills. Abdominal pain is mild and controlled with oral medications. She Is not breastfeeding.     Objective:     Vital Signs (Most Recent):  Temp: 98.3 °F (36.8 °C) (25 0713)  Pulse: 92 (25 07)  Resp: 18 (25 0434)  BP: 130/78 (25)  SpO2: 99 % (25 07) Vital Signs (24h Range):  Temp:  [98.1 °F (36.7 °C)-98.4 °F (36.9 °C)] 98.3 °F (36.8 °C)  Pulse:  [] 92  Resp:  [18] 18  SpO2:  [99 %-100 %] 99 %  BP: (101-146)/(63-90) 130/78     Weight: 91.2 kg (201 lb 1 oz)  Body mass index is 36.77 kg/m².    No intake or output data in the 24 hours ending 25 0907      Significant Labs:  Lab Results   Component Value Date    GROUPTRH O POS 2025    HEPBSAG Non-reactive 2024     No results for input(s): "HGB", "HCT" in the last 48 hours.    I have personallly reviewed all pertinent lab results from the last 24 hours.  Recent Lab Results       None            Physical Exam:   Constitutional: She is oriented to person, place, and time. " She appears well-developed. No distress.    HENT:   Head: Normocephalic and atraumatic.    Eyes: Conjunctivae are normal. Right eye exhibits no discharge. Left eye exhibits no discharge. No scleral icterus.      Pulmonary/Chest: Effort normal. No respiratory distress.        Abdominal: Soft. She exhibits abdominal incision (Pfannenstiel incision with acquacel dressing in place, CDI. Appropriately TTP.). She exhibits no distension.   Fundus firm and below the level of the umbilicus             Musculoskeletal: Normal range of motion. No edema.       Neurological: She is alert and oriented to person, place, and time.    Skin: Skin is warm and dry. She is not diaphoretic.          Assessment/Plan:     20 y.o. female  for:    * Status post primary low transverse  section  Underwent  on 25.  - POD 4  - Tolerating PO  - Pain managed on current regimen  - Ambulating  - Voiding spontaneously  - Doing well, ready for discharge        Gestational hypertension, third trimester  - BP stable without medications, continue to monitor        Disposition: As patient meets milestones, will plan to discharge today.    Gabriela Romo PA-C  Obstetrics  [facility] ([facility])

## 2025-06-20 NOTE — ASSESSMENT & PLAN NOTE
Underwent  on 25.  - POD 4  - Tolerating PO  - Pain managed on current regimen  - Ambulating  - Voiding spontaneously  - Doing well, ready for discharge

## 2025-06-23 ENCOUNTER — POSTPARTUM VISIT (OUTPATIENT)
Dept: OBSTETRICS AND GYNECOLOGY | Facility: CLINIC | Age: 20
End: 2025-06-23
Payer: COMMERCIAL

## 2025-06-23 VITALS
HEIGHT: 62 IN | DIASTOLIC BLOOD PRESSURE: 78 MMHG | SYSTOLIC BLOOD PRESSURE: 112 MMHG | BODY MASS INDEX: 34.64 KG/M2 | WEIGHT: 188.25 LBS

## 2025-06-23 DIAGNOSIS — Z48.01 DRESSING CHANGE OR REMOVAL, SURGICAL WOUND: Primary | ICD-10-CM

## 2025-06-23 PROCEDURE — 99999 PR PBB SHADOW E&M-EST. PATIENT-LVL III: CPT | Mod: PBBFAC,,,

## 2025-06-23 PROCEDURE — 99024 POSTOP FOLLOW-UP VISIT: CPT | Mod: CPTII,S$GLB,,

## 2025-06-23 NOTE — PROGRESS NOTES
"Subjective:       Sandy Aguilar is a 20 y.o. female who presents for a dressing removal. She is 1 weeks postpartum following a low cervical transverse  section. I have fully reviewed the prenatal and intrapartum course. The delivery was at 33.1 gestational weeks. Outcome: primary  section, low transverse incision. Anesthesia: spinal. Postpartum course has been maternal/infant separation. Infant in NICU. Baby's course has been prematurity and NICU admission . Baby is feeding by breast. Bleeding thin lochia. Bowel function is having bowel movements but feels that it is not her normal pattern. Discussed mirilax and stool softeners. Patient verbalized understanding and will try that. Bladder function is normal. Patient is not sexually active. Contraception method is none. Postpartum depression screening: negative.    The following portions of the patient's history were reviewed and updated as appropriate: allergies, current medications, past family history, past medical history, past social history, past surgical history, and problem list.    Review of Systems  Pertinent items are noted in HPI.     Objective:      /78   Ht 5' 2" (1.575 m)   Wt 85.4 kg (188 lb 4.4 oz)   LMP  (Approximate)   Breastfeeding No   BMI 34.44 kg/m²    General:  alert, appears stated age, and cooperative               Abdomen: soft, non-tender; bowel sounds normal; no masses,  no organomegaly and left corner of incision open but healing appropriately. Remainder of incision well approximated and without evidence of infection                                  Assessment:     Dressing change or removal, surgical wound  - RTC 4-6 weeks for PP visit   - recommended antibacterial soaps and no baths. Continue with showers until PP visit   - splint abdomen with movements         "

## 2025-07-25 ENCOUNTER — TELEPHONE (OUTPATIENT)
Dept: PEDIATRICS | Facility: CLINIC | Age: 20
End: 2025-07-25
Payer: COMMERCIAL

## 2025-07-25 ENCOUNTER — POSTPARTUM VISIT (OUTPATIENT)
Dept: OBSTETRICS AND GYNECOLOGY | Facility: CLINIC | Age: 20
End: 2025-07-25
Payer: COMMERCIAL

## 2025-07-25 VITALS — DIASTOLIC BLOOD PRESSURE: 72 MMHG | BODY MASS INDEX: 34.6 KG/M2 | SYSTOLIC BLOOD PRESSURE: 126 MMHG | WEIGHT: 189.13 LBS

## 2025-07-25 DIAGNOSIS — Z30.011 ENCOUNTER FOR INITIAL PRESCRIPTION OF CONTRACEPTIVE PILLS: ICD-10-CM

## 2025-07-25 PROCEDURE — 99999 PR PBB SHADOW E&M-EST. PATIENT-LVL II: CPT | Mod: PBBFAC,,,

## 2025-07-25 RX ORDER — BUSPIRONE HYDROCHLORIDE 5 MG/1
5 TABLET ORAL 2 TIMES DAILY
Qty: 60 TABLET | Refills: 2 | Status: SHIPPED | OUTPATIENT
Start: 2025-07-25

## 2025-07-25 RX ORDER — SERTRALINE HYDROCHLORIDE 25 MG/1
25 TABLET, FILM COATED ORAL DAILY
Qty: 30 TABLET | Refills: 2 | Status: SHIPPED | OUTPATIENT
Start: 2025-07-25

## 2025-07-25 RX ORDER — NORETHINDRONE ACETATE AND ETHINYL ESTRADIOL 1MG-20(21)
1 KIT ORAL DAILY
Qty: 30 TABLET | Refills: 11 | Status: SHIPPED | OUTPATIENT
Start: 2025-07-25 | End: 2026-07-25

## 2025-07-25 NOTE — PROGRESS NOTES
"Subjective:       Sandy Aguilar is a 20 y.o. female who presents for a postpartum visit. She is 6 weeks postpartum following a low cervical transverse  section. I have fully reviewed the prenatal and intrapartum course. The delivery was at 33.1 gestational weeks. Outcome: primary  section, low transverse incision. Anesthesia: spinal. Postpartum course has been complicated by maternal/infant separation. Baby's course has been prematurity and NICU admission. Baby is feeding by bottle. Bleeding no bleeding. Bowel function is normal. Bladder function is normal. Patient is not sexually active. Contraception method is none. Postpartum depression screening: positive. During intake patient reported to nurse that she has SI. Upon entering room patient is smiling. She is accompanied by partner Chau and .  She is attentive to  and interactive in conversation.  Infant was discharged home on 25. Reports that on Monday (25) she had a rough night with the infant where she had SI. She reports that she immediately discussed this with partner and has no intentions or plans of hurting herself or others.  This was an isolated event.  States that Chau is a huge support to her and they "take turns" feeding and caring for baby in the middle of the night so they do not "burn out".  Reports that she will get around 2-3 hours of sleep at a time which is usually dictated by infant feeding schedule.  She is only eating 1 meal a day at times with some snacks. Discussed importance of nutrition during the postpartum period.  Advised to set an alarm as a reminder to eat.  May try frequent small meals including protein drinks and shakes.  Patient verbalized understanding and will try this.  Discussed hobbies with patient. She states that she likes to swim and color. Discussed utilizing hobbies as a way of taking a "mental break and resetting" patient verbalized understanding and will try this. Partner is " supportive of this plan.  Discussed therapy and medication. Patient would like to move forward with medication at this time. LILIANA Kennedy called to room to assess patient and provide additional resources to patient. Will f/u as needed.      The following portions of the patient's history were reviewed and updated as appropriate: allergies, current medications, past family history, past medical history, past social history, past surgical history, and problem list.    Review of Systems  Pertinent items are noted in HPI.     Objective:      /72 (Patient Position: Sitting)   Wt 85.8 kg (189 lb 2.5 oz)   LMP  (Approximate)   Breastfeeding No   BMI 34.60 kg/m²    General:  alert, appears stated age, and cooperative                                                  Assessment:     Encounter for postpartum visit  - RTC 1 year for WWE with pap  Postpartum depression  -     sertraline (ZOLOFT) 25 MG tablet; Take 1 tablet (25 mg total) by mouth once daily.  Dispense: 30 tablet; Refill: 2  -     busPIRone (BUSPAR) 5 MG Tab; Take 1 tablet (5 mg total) by mouth 2 (two) times daily.  Dispense: 60 tablet; Refill: 2  -     f/u as needed  -     LILIANA Kennedy provided resources. Patient desires medication only for now but will utilize therapy resources as needed.     Encounter for initial prescription of contraceptive pills  -     norethindrone-ethinyl estradiol (JUNEL FE 1/20) 1 mg-20 mcg (21)/75 mg (7) per tablet; Take 1 tablet by mouth once daily.  Dispense: 30 tablet; Refill: 11  -     education reviewed

## 2025-07-25 NOTE — TELEPHONE ENCOUNTER
SW met with pt in exam room. SW explained her role and reason for the visit. SW spoke with her about her feelings of feeling overwhelmed. She stated it was one overwhelming day but she does have any feelings of wanting to harm herself or anyone else. She stated she has her boyfriend (child's father), her father, step-mother, and sister as a support system at home. She declined therapy at this time. SW will follow up as needed.

## 2025-07-28 ENCOUNTER — E-VISIT (OUTPATIENT)
Dept: OBSTETRICS AND GYNECOLOGY | Facility: CLINIC | Age: 20
End: 2025-07-28
Payer: COMMERCIAL

## 2025-07-28 DIAGNOSIS — Z98.891 S/P CESAREAN SECTION: Primary | ICD-10-CM

## 2025-07-28 PROCEDURE — 99499 UNLISTED E&M SERVICE: CPT | Mod: ,,, | Performed by: MIDWIFE

## 2025-07-29 ENCOUNTER — TELEPHONE (OUTPATIENT)
Dept: OBSTETRICS AND GYNECOLOGY | Facility: CLINIC | Age: 20
End: 2025-07-29
Payer: COMMERCIAL

## 2025-07-29 NOTE — TELEPHONE ENCOUNTER
Attempted to contact pt, no answer, lvm to return call to clinic.    Pt had c/s on 06/16    Copied from CRM #[id number]. Topic: General Inquiry - Patient Advice  >> Jul 29, 2025  2:20 PM Ann-Marie wrote:  Type:  Needs Medical Advice    Who Called:  pt  Symptoms (please be specific):  pt states that her stitches are opening up, please have nurse call her back    How long has patient had these symptoms:    Pharmacy name and phone #:    Would the patient rather a call back or a response via [facility]? phone  Best Call Back Number: [phone]   Additional Information:

## 2025-07-29 NOTE — TELEPHONE ENCOUNTER
Spoke with pt, appt scheduled for pt to see PA on tomorrow at [facility], pt voiced understanding.     Copied from CRM #[id number]. Topic: General Inquiry - Return Call  >> Jul 29, 2025  3:31 PM Jeannine wrote:  .Type:  Patient Returning Call    Who Called:Sandy  Who Left Message for Patient:nurse  Does the patient know what this is regarding?:yes  Would the patient rather a call back or a response via [facility]? Call back  Best Call Back Number:[phone]  Additional Information: na

## 2025-07-30 ENCOUNTER — POSTPARTUM VISIT (OUTPATIENT)
Dept: OBSTETRICS AND GYNECOLOGY | Facility: CLINIC | Age: 20
End: 2025-07-30
Payer: COMMERCIAL

## 2025-07-30 VITALS
BODY MASS INDEX: 34.77 KG/M2 | WEIGHT: 188.94 LBS | SYSTOLIC BLOOD PRESSURE: 118 MMHG | HEIGHT: 62 IN | DIASTOLIC BLOOD PRESSURE: 74 MMHG

## 2025-07-30 DIAGNOSIS — Z98.891 STATUS POST PRIMARY LOW TRANSVERSE CESAREAN SECTION: Primary | ICD-10-CM

## 2025-07-30 PROCEDURE — 99999 PR PBB SHADOW E&M-EST. PATIENT-LVL III: CPT | Mod: PBBFAC,,, | Performed by: PHYSICIAN ASSISTANT

## 2025-07-30 PROCEDURE — 0503F POSTPARTUM CARE VISIT: CPT | Mod: CPTII,S$GLB,, | Performed by: PHYSICIAN ASSISTANT

## 2025-07-30 NOTE — PROGRESS NOTES
Patient ID: Sandy Aguilar is a 20 y.o. female.        E-Visit Time Tracking:             Chief Complaint: General Illness (Entered automatically based on patient selection in [employer].)      The patient initiated a request through [employer] on 2025 for evaluation and management with a chief complaint of General Illness (Entered automatically based on patient selection in [employer].)     I evaluated the questionnaire submission on 2025.    [facility]    2025 11:26 PM CDT - Filed by Patient   What do you need help with? Vaginal Concerns   Do you agree to participate in an E-Visit? Yes   If you have any of the following symptoms,  please do not complete an E-Visit,  schedule an appointment with your provider: I acknowledge   Do you have any of the following pregnancy-related conditions? (Pregnant, Possibly pregnant, Breast feeding, None) None   What is the main issue you would like addressed today? My Csection wound feels like it could be opening or infected   Which of the following vaginal concerns do you have? Other   Describe your symptoms. Soreness in one area of my scar , discharge coming from the spot .   Do you have pain while passing urine? No   Do you have any of the following symptoms? Belly pain    Have you taken antibiotics in the last two weeks? No    Do you use any of the following? No   Which of the following applies to your menstrual period? Have not had for a while   Which of the following applies to your menstrual cycle? No bleeding   Do you have spotting between periods? No   Do you have pain with your period? No   Have you had similar symptoms in the past? No   Have you had a temperature of 100.4 or higher? No   Provide any information you feel is important to your history not asked above    Please attach any relevant images or files    Are you able to take your vitals? No         Encounter Diagnosis   Name Primary?    S/P  section Yes        No orders of the  defined types were placed in this encounter.           No follow-ups on file.                    Not appropriate Evisit. Needs in-person visit.

## 2025-07-30 NOTE — PROGRESS NOTES
OBGYN Post-op Clinic  History and Physical    Patient Name: Sandy Aguilar  YOB: 2005 (20 y.o.)  MRN: [id number]  Today's Date: 2025    Referring Md:   No referring provider defined for this encounter.    SUBJECTIVE:     Chief Complaint: Post-op Incision Check    History of Present Illness:  Sandy Aguilar is a 20 y.o. female  who presents to the clinic today for incision check. S/p  on 25. States that she noticed some tenderness and light bleeding from the center of her incision recently. Denies drainage, fever, or other symptoms.       Review of patient's allergies indicates:  No Known Allergies    History reviewed. No pertinent past medical history.  Past Surgical History:   Procedure Laterality Date     SECTION N/A 2025    Procedure:  SECTION;  Surgeon: Ami Felton MD;  Location: [facility];  Service: OB/GYN;  Laterality: N/A;    CHONDROPLASTY OF KNEE Right 2022    Procedure: CHONDROPLASTY, KNEE;  Surgeon: Eleazar Del Rosario MD;  Location: [facility];  Service: Orthopedics;  Laterality: Right;    EXCISION OF MEDIAL MENISCUS OF KNEE Right 2022    Procedure: MENISCECTOMY, KNEE, MEDIAL;  Surgeon: Eleazar Del Rosario MD;  Location: [facility];  Service: Orthopedics;  Laterality: Right;    KNEE ARTHROSCOPY W/ MENISCECTOMY Right 2022    Procedure: ARTHROSCOPY, KNEE, WITH MENISCECTOMY;  Surgeon: Eleazar Del Rosario MD;  Location: [facility];  Service: Orthopedics;  Laterality: Right;  medial meniscectomy    RECONSTRUCTION OF ANTERIOR CRUCIATE LIGAMENT USING GRAFT Right 2022    Procedure: RECONSTRUCTION, KNEE, ACL, USING GRAFT;  Surgeon: Eleazar Del Rosario MD;  Location: [facility];  Service: Orthopedics;  Laterality: Right;  cartilage harvest     Family History   Problem Relation Name Age of Onset    No Known Problems Mother      No Known Problems Father      Breast cancer Neg Hx      Ovarian cancer Neg Hx      Colon cancer Neg Hx       Social  "History[1]     Review of Systems:  Review of Systems   Constitutional:  Negative for chills and fever.   HENT:  Negative for congestion and sore throat.    Respiratory:  Negative for cough and shortness of breath.    Cardiovascular:  Negative for chest pain and leg swelling.   Gastrointestinal:  Negative for constipation, diarrhea, nausea and vomiting.   Genitourinary:  Negative for dysuria.   Musculoskeletal:  Negative for myalgias.   Skin:  Negative for rash.   Neurological:  Negative for weakness and headaches.       OBJECTIVE:     Vital Signs (Most Recent)  /74 (Patient Position: Sitting)   Ht 5' 2" (1.575 m)   Wt 85.7 kg (188 lb 15 oz)   Breastfeeding No   BMI 34.56 kg/m²   Vitals:    07/30/25 1008   BP: 118/74        Physical Exam  Vitals and nursing note reviewed.   Constitutional:       General: She is not in acute distress.     Appearance: Normal appearance.   HENT:      Head: Normocephalic and atraumatic.   Eyes:      General: No scleral icterus.        Right eye: No discharge.         Left eye: No discharge.      Conjunctiva/sclera: Conjunctivae normal.   Pulmonary:      Effort: Pulmonary effort is normal. No respiratory distress.   Abdominal:      Palpations: Abdomen is soft.      Tenderness: There is no abdominal tenderness.      Comments: Pfannenstiel incision well healed. Small area of healing from inflamed hair follicle. Incision intact with no drainage or bleeding.    Musculoskeletal:         General: Normal range of motion.      Cervical back: Normal range of motion.      Right lower leg: No edema.      Left lower leg: No edema.   Skin:     General: Skin is warm and dry.   Neurological:      General: No focal deficit present.      Mental Status: She is alert and oriented to person, place, and time.   Psychiatric:         Mood and Affect: Mood normal.         Behavior: Behavior normal.         Thought Content: Thought content normal.         Judgment: Judgment normal. "             ASSESSMENT/PLAN:     Sandy Aguilar is a 20 y.o. female was seen today for incision check. Incision healing well. The area of concern was a hair follicle that had grown inflamed/infected and is now resolving.   Instructed patient to call clinic with any concerning issues or symptoms. Patient verbalized understanding.     Sandy was seen today for postpartum care.    Diagnoses and all orders for this visit:    Status post primary low transverse  section                PHILLIP Sawyer, PA-C  OBGYN - Surgery  [facility]                 [1]   Social History  Tobacco Use    Smoking status: Every Day     Types: Vaping with nicotine     Passive exposure: Yes    Smokeless tobacco: Never   Substance Use Topics    Alcohol use: No    Drug use: Not Currently     Types: Marijuana

## 2025-08-04 ENCOUNTER — HOSPITAL ENCOUNTER (EMERGENCY)
Facility: HOSPITAL | Age: 20
Discharge: HOME | End: 2025-08-04
Attending: EMERGENCY MEDICINE
Payer: COMMERCIAL

## 2025-08-04 VITALS
WEIGHT: 193.19 LBS | OXYGEN SATURATION: 100 % | DIASTOLIC BLOOD PRESSURE: 76 MMHG | HEART RATE: 72 BPM | HEIGHT: 62 IN | TEMPERATURE: 98 F | RESPIRATION RATE: 14 BRPM | BODY MASS INDEX: 35.55 KG/M2 | SYSTOLIC BLOOD PRESSURE: 127 MMHG

## 2025-08-04 DIAGNOSIS — L03.011 PARONYCHIA OF FINGER OF RIGHT HAND: Primary | ICD-10-CM

## 2025-08-04 DIAGNOSIS — Z76.89 ENCOUNTER FOR INCISION AND DRAINAGE PROCEDURE: ICD-10-CM

## 2025-08-04 PROCEDURE — 25000003 PHARM REV CODE 250: Performed by: NURSE PRACTITIONER

## 2025-08-04 PROCEDURE — 10060 I&D ABSCESS SIMPLE/SINGLE: CPT

## 2025-08-04 PROCEDURE — 99283 EMERGENCY DEPT VISIT LOW MDM: CPT | Mod: 25

## 2025-08-04 RX ORDER — CEPHALEXIN 500 MG/1
500 CAPSULE ORAL 4 TIMES DAILY
Qty: 20 CAPSULE | Refills: 0 | Status: SHIPPED | OUTPATIENT
Start: 2025-08-04 | End: 2025-08-04

## 2025-08-04 RX ORDER — CEPHALEXIN 500 MG/1
500 CAPSULE ORAL 4 TIMES DAILY
Qty: 20 CAPSULE | Refills: 0 | Status: SHIPPED | OUTPATIENT
Start: 2025-08-04 | End: 2025-08-09

## 2025-08-04 RX ORDER — LIDOCAINE AND PRILOCAINE 25; 25 MG/G; MG/G
CREAM TOPICAL ONCE
Status: COMPLETED | OUTPATIENT
Start: 2025-08-04 | End: 2025-08-04

## 2025-08-04 RX ADMIN — LIDOCAINE AND PRILOCAINE: 25; 25 CREAM TOPICAL at 01:08

## (undated) DEVICE — NEPTUNE 4 PORT MANIFOLD

## (undated) DEVICE — UNDERGLOVES BIOGEL PI SIZE 8.5

## (undated) DEVICE — SUT 3-0 MONOCRYL PLUS PS-2

## (undated) DEVICE — DRAPE PLASTIC U 60X72

## (undated) DEVICE — BLADE SHAVER TORPEDO 4MMX13CM

## (undated) DEVICE — BANDAGE ESMARK ELASTIC ST 6X9

## (undated) DEVICE — UNDERGLOVES BIOGEL PI SZ 6 LF

## (undated) DEVICE — GOWN SMARTGOWN LVL4 X-LONG XL

## (undated) DEVICE — SLEEVE SCD EXPRESS CALF LARGE

## (undated) DEVICE — BLADE SURG #15 CARBON STEEL

## (undated) DEVICE — SUT FIBERWIRE 2-0 50 BLK

## (undated) DEVICE — BANDAGE ACE DOUBLE STER 6IN

## (undated) DEVICE — TOWEL OR DISP STRL BLUE 4/PK

## (undated) DEVICE — SEE MEDLINE ITEM 157125

## (undated) DEVICE — PAD CAST SPECIALIST STRL 6

## (undated) DEVICE — SUT FIBERWIRE LOOP TIGER 2

## (undated) DEVICE — MANIFOLD 4 PORT

## (undated) DEVICE — GAUZE SPONGE 4X4 12PLY

## (undated) DEVICE — SEE MEDLINE ITEM 146292

## (undated) DEVICE — SUPPORT ULNA NERVE PROTECTOR

## (undated) DEVICE — CLOSURE SKIN STERI STRIP 1/2X4

## (undated) DEVICE — SUT CTD VICRYL 0 UND BR SUT

## (undated) DEVICE — TAPE SURG MEDIPORE 6X72IN

## (undated) DEVICE — PAD ABD 8X10 STERILE

## (undated) DEVICE — GLOVE PROTEXIS LTX  8.5

## (undated) DEVICE — TUBING PUMP ARTHROSCOPY STRL

## (undated) DEVICE — PASSER SUTURE SCORPION 3.2MM

## (undated) DEVICE — TAPE SILK 3IN

## (undated) DEVICE — SEE MEDLINE ITEM 146298

## (undated) DEVICE — DRAPE STERI INSTRUMENT 1018

## (undated) DEVICE — SUT VICRYL PLUS 2-0 CT1 18

## (undated) DEVICE — SOL IRR NACL .9% 3000ML

## (undated) DEVICE — UNDERGLOVES BIOGEL PI SIZE 8

## (undated) DEVICE — PROBE MULTI PORT RF 90 DEGREE

## (undated) DEVICE — DRAPE STERI U-SHAPED 47X51IN

## (undated) DEVICE — COVER CAMERA OPERATING ROOM

## (undated) DEVICE — SUT FIBERWIRE

## (undated) DEVICE — PASSER SUTURELASSO MICROSURG

## (undated) DEVICE — TIP SUCTION YANKAUER

## (undated) DEVICE — SEE MEDLINE ITEM 157131

## (undated) DEVICE — ELECTRODE REM PLYHSV RETURN 9

## (undated) DEVICE — SEE MEDLINE ITEM 157216

## (undated) DEVICE — SEE MEDLINE ITEM 157117

## (undated) DEVICE — COVER LIGHT HANDLE 80/CA

## (undated) DEVICE — BLADE SURG CARBON STEEL #10

## (undated) DEVICE — SEE MEDLINE ITEM 157027

## (undated) DEVICE — SUT VICRYL PLUS 0 CT1 18IN

## (undated) DEVICE — QUAD TENDON GRAFT CUTTING BLADE, 9MM

## (undated) DEVICE — GLOVE BIOGEL 7.5

## (undated) DEVICE — TUBING SUCTION STRAIGHT .25X20

## (undated) DEVICE — BRACE KNEE T SCOPE PREMIER

## (undated) DEVICE — SHAVER SABRETOOTH 4MMX12.5CM

## (undated) DEVICE — NDL SPINAL 18GX3.5 SPINOCAN

## (undated) DEVICE — SYR ONLY LUER LOCK 20CC

## (undated) DEVICE — KIT BIOPSY CARTLIAGE

## (undated) DEVICE — BNDG COFLEX FOAM LF2 ST 6X5YD

## (undated) DEVICE — GLOVE SURG BIOGEL LATEX SZ 7.5

## (undated) DEVICE — MAT SURGICAL ECOSUCTIONER

## (undated) DEVICE — PAD ABDOMINAL STERILE 8X10IN

## (undated) DEVICE — DRESSING AQUACEL AG 3.5X10IN

## (undated) DEVICE — ADHESIVE MASTISOL VIAL 48/BX

## (undated) DEVICE — SHAVER SABRETOOTH CRV 4MMX13CM

## (undated) DEVICE — SUT MONOCYRL 4-0 PS2 UND

## (undated) DEVICE — APPLICATOR CHLORAPREP ORN 26ML

## (undated) DEVICE — TAPE SURGICAL MICROFOAM 3IN

## (undated) DEVICE — GLOVE SURGEONS ULTRA TOUCH 5.5

## (undated) DEVICE — DISPOSABLES KIT, TRANSTIBIAL ACL WITH SAW BLADE

## (undated) DEVICE — POSITIONER HEAD DONUT 9IN FOAM

## (undated) DEVICE — SUT 2 20 FIBERLOOP STR NDL

## (undated) DEVICE — TUBING NEPTUNE 2 SMOKE 10IN